# Patient Record
Sex: MALE | Race: WHITE | NOT HISPANIC OR LATINO | Employment: FULL TIME | RURAL
[De-identification: names, ages, dates, MRNs, and addresses within clinical notes are randomized per-mention and may not be internally consistent; named-entity substitution may affect disease eponyms.]

---

## 2023-01-29 ENCOUNTER — HOSPITAL ENCOUNTER (EMERGENCY)
Facility: HOSPITAL | Age: 63
Discharge: HOME | End: 2023-01-29
Attending: EMERGENCY MEDICINE
Payer: COMMERCIAL

## 2023-01-29 VITALS
RESPIRATION RATE: 16 BRPM | TEMPERATURE: 98 F | BODY MASS INDEX: 25.76 KG/M2 | OXYGEN SATURATION: 99 % | DIASTOLIC BLOOD PRESSURE: 79 MMHG | SYSTOLIC BLOOD PRESSURE: 149 MMHG | WEIGHT: 170 LBS | HEART RATE: 82 BPM | HEIGHT: 68 IN

## 2023-01-29 DIAGNOSIS — H00.012 HORDEOLUM EXTERNUM OF RIGHT LOWER EYELID: Primary | ICD-10-CM

## 2023-01-29 PROCEDURE — 99284 EMERGENCY DEPT VISIT MOD MDM: CPT | Mod: ,,, | Performed by: EMERGENCY MEDICINE

## 2023-01-29 PROCEDURE — 99283 EMERGENCY DEPT VISIT LOW MDM: CPT

## 2023-01-29 PROCEDURE — 99284 PR EMERGENCY DEPT VISIT,LEVEL IV: ICD-10-PCS | Mod: ,,, | Performed by: EMERGENCY MEDICINE

## 2023-01-29 RX ORDER — GENTAMICIN SULFATE 3 MG/ML
2 SOLUTION/ DROPS OPHTHALMIC 3 TIMES DAILY
Qty: 15 ML | Refills: 0 | Status: SHIPPED | OUTPATIENT
Start: 2023-01-29

## 2023-01-29 RX ORDER — GENTAMICIN SULFATE 3 MG/ML
2 SOLUTION/ DROPS OPHTHALMIC 3 TIMES DAILY
Qty: 15 ML | Refills: 0 | Status: SHIPPED | OUTPATIENT
Start: 2023-01-29 | End: 2023-01-29 | Stop reason: SDUPTHER

## 2023-01-29 NOTE — ED PROVIDER NOTES
"Encounter Date: 1/29/2023       History     Chief Complaint   Patient presents with    Eye Problem     C/o red bump on eyelid that began yesterday. No prior treatment.     PT HAS "RED BUMP" OF EYELID ONSET YESTERDAY. PT DENIES ADDITIONAL COMPLAINTS    Review of patient's allergies indicates:  No Known Allergies  No past medical history on file.  No past surgical history on file.  No family history on file.     Review of Systems   Constitutional:  Negative for fever.   HENT:  Negative for sore throat.    Eyes:  Positive for redness.   Respiratory:  Negative for shortness of breath.    Cardiovascular:  Negative for chest pain.   Gastrointestinal:  Negative for nausea.   Genitourinary:  Negative for dysuria.   Musculoskeletal:  Negative for back pain.   Skin:  Negative for rash.   Neurological:  Negative for weakness.   Hematological:  Does not bruise/bleed easily.     Physical Exam     Initial Vitals [01/29/23 0805]   BP Pulse Resp Temp SpO2   (!) 149/79 82 16 97.9 °F (36.6 °C) 99 %      MAP       --         Physical Exam    Constitutional: He appears well-developed and well-nourished. He is cooperative. He appears distressed.   HENT:   Head: Normocephalic and atraumatic.   Eyes: Conjunctivae and EOM are normal. Pupils are equal, round, and reactive to light. Right eye exhibits hordeolum.   Neck: Trachea normal. Neck supple.   Cardiovascular:  Regular rhythm, normal heart sounds and intact distal pulses.           Pulses:       Radial pulses are 3+ on the right side and 3+ on the left side.        Dorsalis pedis pulses are 3+ on the right side and 3+ on the left side.   Musculoskeletal:      Right shoulder: Normal.      Left shoulder: Normal.      Right upper arm: Normal.      Left upper arm: Normal.      Right elbow: Normal.      Left elbow: Normal.      Right forearm: Normal.      Left forearm: Normal.      Right wrist: Normal.      Left wrist: Normal.      Right hand: Normal.      Left hand: Normal.      Cervical " "back: Neck supple.     Lymphadenopathy:     He has no cervical adenopathy.     He has no axillary adenopathy.   Neurological: He is alert and oriented to person, place, and time. He has normal strength. No cranial nerve deficit or sensory deficit. He displays a negative Romberg sign. GCS eye subscore is 4. GCS verbal subscore is 5. GCS motor subscore is 6.   Reflex Scores:       Brachioradialis reflexes are 2+ on the right side and 2+ on the left side.       Patellar reflexes are 2+ on the right side and 2+ on the left side.  Skin: Skin is warm and dry. Capillary refill takes less than 2 seconds.   Psychiatric: He has a normal mood and affect. His speech is normal and behavior is normal. Judgment and thought content normal. Cognition and memory are normal.       Medical Screening Exam   See Full Note    ED Course   Procedures  Labs Reviewed - No data to display       Imaging Results    None          Medications - No data to display  Medical Decision Making:   Initial Assessment:   PT HAS "RED BUMP" OF EYELID ONSET YESTERDAY. PT DENIES ADDITIONAL COMPLAINTS  Differential Diagnosis:   HORDEOLUM  ED Management:  EXAM  DC IN STABLE CONDITION  WARM COMPRESSES 3 TIMES A DAY  DO NOT RUB EYE  FREQUENT HAND WASHING                 Clinical Impression:   Final diagnoses:  [H00.012] Hordeolum externum of right lower eyelid (Primary)        ED Disposition Condition    Discharge Stable          ED Prescriptions       Medication Sig Dispense Start Date End Date Auth. Provider    gentamicin (GARAMYCIN) 0.3 % ophthalmic solution  (Status: Discontinued) Place 2 drops into the right eye 3 (three) times daily. 15 mL 1/29/2023 1/29/2023 Marielena Qureshi MD    gentamicin (GARAMYCIN) 0.3 % ophthalmic solution Place 2 drops into the right eye 3 (three) times daily. 15 mL 1/29/2023 -- Marielena Qureshi MD          Follow-up Information       Follow up With Specialties Details Why Contact Info    [address], [facility] Call in 1 " week  [address] [facility]  [facility] [place] [address]  [phone]               Marielena Qureshi MD  06/10/23 5506

## 2023-10-30 ENCOUNTER — CLINICAL SUPPORT (OUTPATIENT)
Dept: AUDIOLOGY | Facility: CLINIC | Age: 63
End: 2023-10-30
Payer: COMMERCIAL

## 2023-10-30 ENCOUNTER — OFFICE VISIT (OUTPATIENT)
Dept: OTOLARYNGOLOGY | Facility: CLINIC | Age: 63
End: 2023-10-30
Payer: COMMERCIAL

## 2023-10-30 VITALS — WEIGHT: 170 LBS | BODY MASS INDEX: 25.76 KG/M2 | HEIGHT: 68 IN

## 2023-10-30 DIAGNOSIS — H90.3 SENSORINEURAL HEARING LOSS, BILATERAL: Primary | ICD-10-CM

## 2023-10-30 DIAGNOSIS — H90.3 SENSORY HEARING LOSS, BILATERAL: Primary | ICD-10-CM

## 2023-10-30 DIAGNOSIS — H90.3 SENSORINEURAL HEARING LOSS (SNHL) OF BOTH EARS: ICD-10-CM

## 2023-10-30 DIAGNOSIS — H61.23 BILATERAL IMPACTED CERUMEN: Primary | ICD-10-CM

## 2023-10-30 DIAGNOSIS — H90.2 CONDUCTIVE HEARING LOSS, UNSPECIFIED LATERALITY: ICD-10-CM

## 2023-10-30 PROCEDURE — 92588 EVOKED AUDITORY TST COMPLETE: CPT | Mod: PBBFAC | Performed by: AUDIOLOGIST

## 2023-10-30 PROCEDURE — 69210 PR REMOVAL IMPACTED CERUMEN REQUIRING INSTRUMENTATION, UNILATERAL: ICD-10-PCS | Mod: S$PBB,,, | Performed by: OTOLARYNGOLOGY

## 2023-10-30 PROCEDURE — 92588 EVOKED AUDITORY TST COMPLETE: CPT | Mod: 26,S$PBB,, | Performed by: AUDIOLOGIST

## 2023-10-30 PROCEDURE — 92588 EVOKED AUDITORY TST COMPLETE: ICD-10-PCS | Mod: 26,S$PBB,, | Performed by: AUDIOLOGIST

## 2023-10-30 PROCEDURE — 99212 OFFICE O/P EST SF 10 MIN: CPT | Mod: PBBFAC,25 | Performed by: AUDIOLOGIST

## 2023-10-30 PROCEDURE — 99499 UNLISTED E&M SERVICE: CPT | Mod: S$PBB,,, | Performed by: OTOLARYNGOLOGY

## 2023-10-30 PROCEDURE — 69210 REMOVE IMPACTED EAR WAX UNI: CPT | Mod: S$PBB,,, | Performed by: OTOLARYNGOLOGY

## 2023-10-30 PROCEDURE — 1159F PR MEDICATION LIST DOCUMENTED IN MEDICAL RECORD: ICD-10-PCS | Mod: CPTII,,, | Performed by: OTOLARYNGOLOGY

## 2023-10-30 PROCEDURE — 69210 REMOVE IMPACTED EAR WAX UNI: CPT | Mod: 50,PBBFAC | Performed by: OTOLARYNGOLOGY

## 2023-10-30 PROCEDURE — 1160F RVW MEDS BY RX/DR IN RCRD: CPT | Mod: CPTII,,, | Performed by: OTOLARYNGOLOGY

## 2023-10-30 PROCEDURE — 99212 OFFICE O/P EST SF 10 MIN: CPT | Mod: PBBFAC,25

## 2023-10-30 PROCEDURE — 99204 OFFICE O/P NEW MOD 45 MIN: CPT | Mod: 25,S$PBB,, | Performed by: OTOLARYNGOLOGY

## 2023-10-30 PROCEDURE — 1159F MED LIST DOCD IN RCRD: CPT | Mod: CPTII,,, | Performed by: OTOLARYNGOLOGY

## 2023-10-30 PROCEDURE — 99499 NO LOS: ICD-10-PCS | Mod: S$PBB,,, | Performed by: OTOLARYNGOLOGY

## 2023-10-30 PROCEDURE — 3008F PR BODY MASS INDEX (BMI) DOCUMENTED: ICD-10-PCS | Mod: CPTII,,, | Performed by: OTOLARYNGOLOGY

## 2023-10-30 PROCEDURE — 99213 OFFICE O/P EST LOW 20 MIN: CPT | Mod: PBBFAC | Performed by: OTOLARYNGOLOGY

## 2023-10-30 PROCEDURE — 1160F PR REVIEW ALL MEDS BY PRESCRIBER/CLIN PHARMACIST DOCUMENTED: ICD-10-PCS | Mod: CPTII,,, | Performed by: OTOLARYNGOLOGY

## 2023-10-30 PROCEDURE — 3008F BODY MASS INDEX DOCD: CPT | Mod: CPTII,,, | Performed by: OTOLARYNGOLOGY

## 2023-10-30 PROCEDURE — 99204 PR OFFICE/OUTPT VISIT, NEW, LEVL IV, 45-59 MIN: ICD-10-PCS | Mod: 25,S$PBB,, | Performed by: OTOLARYNGOLOGY

## 2023-10-30 NOTE — PROGRESS NOTES
Subjective:       Patient ID: Mustapha Christina is a 63 y.o. male.    Chief Complaint: Cerumen Impaction (Bilateral ear cleaning. ) and Hearing Loss (Bilateral hearing loss.)    Hearing Loss:    Associated symptoms: Ear pain.      Review of Systems   HENT:  Positive for ear pain and hearing loss.    All other systems reviewed and are negative.      Objective:      Physical Exam  General: NAD  Head: Normocephalic, atraumatic, no facial asymmetry/normal strength,  Ears: Both auricules normal in appearance, w/o deformities tympanic membranes normal external auditory canals severe wax impaction  Nose: External nose w/o deformities normal turbinates no drainage or inflammation  Oral Cavity: Lips, gums, floor of mouth, tongue hard palate, and buccal mucosa without mass/lesion  Oropharynx: Mucosa pink and moist, soft palate, posterior pharynx and oropharyngeal wall without mass/lesion  Neck: Supple, symmetric, trachea midline, no palpable mass/lesion, no palpable cervical lymphadenopathy  Skin: Warm and dry, no concerning lesions  Respiratory: Respirations even, unlabored     Procedure: Binocular microscopy with removal of cerumen impaction using microsurgical instrumentation.  After explaining the procedure and obtaining verbal assent,  each external auditory canal visualized with the 250 mm focal length lens through the operating microscope. The obstructing cerumen was removed with microsurgical instrumentation to reveal normal and healthy external auditory canals. The patient tolerated this procedure well without complication.     Assessment:       1. Bilateral impacted cerumen    2. Conductive hearing loss, unspecified laterality    3. Sensorineural hearing loss (SNHL) of both ears        Plan:       Reviewed and discussed audiogram in detail  quita DENNEY

## 2023-10-30 NOTE — PROGRESS NOTES
Consult:  Patient ref. by Dr Skyler Abel  Payer Status: VR  Patient preference : Ronny Brasher [id number]  Status: ordered No  Earmold: No    Will contact patient upon device arrival and schedule fitting.

## 2023-12-17 ENCOUNTER — HOSPITAL ENCOUNTER (EMERGENCY)
Facility: HOSPITAL | Age: 63
Discharge: HOME | End: 2023-12-17
Payer: COMMERCIAL

## 2023-12-17 VITALS
BODY MASS INDEX: 27.6 KG/M2 | HEART RATE: 97 BPM | TEMPERATURE: 98 F | RESPIRATION RATE: 20 BRPM | WEIGHT: 182.13 LBS | DIASTOLIC BLOOD PRESSURE: 80 MMHG | HEIGHT: 68 IN | OXYGEN SATURATION: 97 % | SYSTOLIC BLOOD PRESSURE: 133 MMHG

## 2023-12-17 DIAGNOSIS — J06.9 UPPER RESPIRATORY TRACT INFECTION, UNSPECIFIED TYPE: Primary | ICD-10-CM

## 2023-12-17 DIAGNOSIS — J32.9 SINUSITIS, UNSPECIFIED CHRONICITY, UNSPECIFIED LOCATION: ICD-10-CM

## 2023-12-17 DIAGNOSIS — J02.9 PHARYNGITIS, UNSPECIFIED ETIOLOGY: ICD-10-CM

## 2023-12-17 PROCEDURE — 99284 EMERGENCY DEPT VISIT MOD MDM: CPT | Mod: ,,, | Performed by: NURSE PRACTITIONER

## 2023-12-17 PROCEDURE — 99283 EMERGENCY DEPT VISIT LOW MDM: CPT

## 2023-12-17 PROCEDURE — 99284 PR EMERGENCY DEPT VISIT,LEVEL IV: ICD-10-PCS | Mod: ,,, | Performed by: NURSE PRACTITIONER

## 2023-12-17 RX ORDER — AZITHROMYCIN 500 MG/1
500 TABLET, FILM COATED ORAL DAILY
Qty: 3 TABLET | Refills: 0 | Status: SHIPPED | OUTPATIENT
Start: 2023-12-17

## 2023-12-17 RX ORDER — GUAIFENESIN AND DEXTROMETHORPHAN HYDROBROMIDE 1200; 60 MG/1; MG/1
1 TABLET, EXTENDED RELEASE ORAL 2 TIMES DAILY PRN
Qty: 20 TABLET | Refills: 0 | Status: SHIPPED | OUTPATIENT
Start: 2023-12-17 | End: 2023-12-27

## 2023-12-17 NOTE — Clinical Note
"Mustapha Lind" Carri was seen and treated in our emergency department on 12/17/2023.  He may return to work on 12/19/2023.       If you have any questions or concerns, please don't hesitate to call.      Abdi Campa, QUITAP"

## 2023-12-17 NOTE — DISCHARGE INSTRUCTIONS
Plenty of fluids  Mucinex as directed  Zithromax as directed  Follow-up with primary care or return to the emergency department if worsening symptoms

## 2023-12-17 NOTE — ED PROVIDER NOTES
Encounter Date: 12/17/2023       History     Chief Complaint   Patient presents with    Sore Throat    Nasal Congestion     64 y/o male presents for congestion, sore throat ongoing less than 24 hours.  Denies fever, but reported night sweat last night.  Has been around others as work with similar symptoms.  He is able to drink/eat without difficulty.  Denies OTC use with symptoms.  No chest pain or reported SOB.      Review of patient's allergies indicates:  No Known Allergies  History reviewed. No pertinent past medical history.  History reviewed. No pertinent surgical history.  History reviewed. No pertinent family history.     Review of Systems   Constitutional:  Negative for fever.   HENT:  Positive for congestion, postnasal drip, sinus pressure and sore throat.    Respiratory:  Negative for shortness of breath.    Cardiovascular:  Negative for chest pain.   Gastrointestinal:  Negative for nausea.   Genitourinary:  Negative for dysuria.   Musculoskeletal:  Negative for back pain.   Skin:  Negative for rash.   Neurological:  Negative for weakness.   Hematological:  Does not bruise/bleed easily.   All other systems reviewed and are negative.      Physical Exam     Initial Vitals [12/17/23 0828]   BP Pulse Resp Temp SpO2   133/80 97 20 98.2 °F (36.8 °C) 97 %      MAP       --         Physical Exam    Constitutional: He appears well-developed and well-nourished.   HENT:   Head: Normocephalic.   Eyes: EOM are normal. Pupils are equal, round, and reactive to light.   Neck: Neck supple.   Cardiovascular:  Normal rate, regular rhythm, normal heart sounds and intact distal pulses.           Pulmonary/Chest: Breath sounds normal.   Abdominal: Abdomen is soft. Bowel sounds are normal.   Musculoskeletal:         General: Normal range of motion.      Cervical back: Neck supple.     Neurological: He is alert and oriented to person, place, and time. He has normal strength. No cranial nerve deficit or sensory deficit. GCS score  is 15. GCS eye subscore is 4. GCS verbal subscore is 5. GCS motor subscore is 6.   Skin: Skin is warm and dry. Capillary refill takes less than 2 seconds.   Psychiatric: He has a normal mood and affect. His behavior is normal. Thought content normal.         Medical Screening Exam   See Full Note    ED Course   Procedures  Labs Reviewed - No data to display       Imaging Results    None          Medications - No data to display  Medical Decision Making  64 y/o male presents for congestion, sore throat ongoing less than 24 hours.  Denies fever, but reported night sweat last night.  Has been around others as work with similar symptoms.  He is able to drink/eat without difficulty.  Denies OTC use with symptoms.  No chest pain or reported SOB.    Amount and/or Complexity of Data Reviewed  Discussion of management or test interpretation with external provider(s): No needed labs/imaging, will treat symptoms with rx for zithromax and mucinex.  Plenty of fluids, will give day from work.  Simple urI presentation    Risk  OTC drugs.                                      Clinical Impression:   Final diagnoses:  [J06.9] Upper respiratory tract infection, unspecified type (Primary)  [J02.9] Pharyngitis, unspecified etiology  [J32.9] Sinusitis, unspecified chronicity, unspecified location        ED Disposition Condition    Discharge Stable          ED Prescriptions       Medication Sig Dispense Start Date End Date Auth. Provider    azithromycin (ZITHROMAX) 500 MG tablet Take 1 tablet (500 mg total) by mouth once daily. 3 tablet 12/17/2023 -- Abdi Campa, BRADY    dextromethorphan-guaiFENesin (MUCINEX DM) 60-1,200 mg per 12 hr tablet Take 1 tablet by mouth 2 (two) times daily as needed (congestion). 20 tablet 12/17/2023 12/27/2023 Abdi Campa FNP          Follow-up Information    None          Abdi Campa FNP  12/17/23 2692

## 2023-12-17 NOTE — ED TRIAGE NOTES
Presents to ED for complaints of chest and nasal congestion and sore throat since yesterday.  Took Nyquil last night.

## 2024-02-29 ENCOUNTER — CLINICAL SUPPORT (OUTPATIENT)
Dept: AUDIOLOGY | Facility: CLINIC | Age: 64
End: 2024-02-29
Payer: COMMERCIAL

## 2024-02-29 DIAGNOSIS — H90.3 SENSORY HEARING LOSS, BILATERAL: Primary | ICD-10-CM

## 2024-02-29 PROCEDURE — 99211 OFF/OP EST MAY X REQ PHY/QHP: CPT | Mod: PBBFAC

## 2024-02-29 PROCEDURE — 99499 UNLISTED E&M SERVICE: CPT | Mod: S$PBB,,, | Performed by: OTOLARYNGOLOGY

## 2024-02-29 NOTE — PROGRESS NOTES
Patient was fit through VR with Kasandra Jefferson [id number] [id number], [id number] with size 1 moderate rec and med open domes.

## 2024-05-16 ENCOUNTER — CLINICAL SUPPORT (OUTPATIENT)
Dept: AUDIOLOGY | Facility: CLINIC | Age: 64
End: 2024-05-16
Payer: COMMERCIAL

## 2024-05-16 DIAGNOSIS — H90.3 SENSORY HEARING LOSS, BILATERAL: Primary | ICD-10-CM

## 2024-06-12 ENCOUNTER — OFFICE VISIT (OUTPATIENT)
Dept: FAMILY MEDICINE | Facility: CLINIC | Age: 64
End: 2024-06-12
Payer: OTHER MISCELLANEOUS

## 2024-06-12 VITALS
SYSTOLIC BLOOD PRESSURE: 149 MMHG | TEMPERATURE: 97 F | DIASTOLIC BLOOD PRESSURE: 82 MMHG | BODY MASS INDEX: 27.74 KG/M2 | RESPIRATION RATE: 18 BRPM | OXYGEN SATURATION: 99 % | WEIGHT: 183 LBS | HEIGHT: 68 IN | HEART RATE: 70 BPM

## 2024-06-12 DIAGNOSIS — Z12.11 ENCOUNTER FOR SCREENING COLONOSCOPY: ICD-10-CM

## 2024-06-12 DIAGNOSIS — G56.22 ULNAR NEUROPATHY OF LEFT UPPER EXTREMITY: ICD-10-CM

## 2024-06-12 DIAGNOSIS — E78.5 HYPERLIPIDEMIA, UNSPECIFIED HYPERLIPIDEMIA TYPE: ICD-10-CM

## 2024-06-12 DIAGNOSIS — M54.50 ACUTE BILATERAL LOW BACK PAIN WITHOUT SCIATICA: ICD-10-CM

## 2024-06-12 DIAGNOSIS — S09.12XA LACERATION OF FASCIA OF HEAD: ICD-10-CM

## 2024-06-12 DIAGNOSIS — R55 SYNCOPE, UNSPECIFIED SYNCOPE TYPE: Primary | ICD-10-CM

## 2024-06-12 LAB
ALBUMIN SERPL BCP-MCNC: 4.2 G/DL (ref 3.5–5)
ALBUMIN/GLOB SERPL: 1.2 {RATIO}
ALP SERPL-CCNC: 111 U/L (ref 45–115)
ALT SERPL W P-5'-P-CCNC: 15 U/L (ref 16–61)
ANION GAP SERPL CALCULATED.3IONS-SCNC: 8 MMOL/L (ref 7–16)
AST SERPL W P-5'-P-CCNC: 15 U/L (ref 15–37)
BASOPHILS # BLD AUTO: 0.01 K/UL (ref 0–0.2)
BASOPHILS NFR BLD AUTO: 0.2 % (ref 0–1)
BILIRUB SERPL-MCNC: 0.4 MG/DL (ref ?–1.2)
BUN SERPL-MCNC: 19 MG/DL (ref 7–18)
BUN/CREAT SERPL: 23 (ref 6–20)
CALCIUM SERPL-MCNC: 9.1 MG/DL (ref 8.5–10.1)
CHLORIDE SERPL-SCNC: 108 MMOL/L (ref 98–107)
CHOLEST SERPL-MCNC: 183 MG/DL (ref 0–200)
CHOLEST/HDLC SERPL: 4.1 {RATIO}
CO2 SERPL-SCNC: 27 MMOL/L (ref 21–32)
CREAT SERPL-MCNC: 0.83 MG/DL (ref 0.7–1.3)
DIFFERENTIAL METHOD BLD: ABNORMAL
EGFR (NO RACE VARIABLE) (RUSH/TITUS): 99 ML/MIN/1.73M2
EOSINOPHIL # BLD AUTO: 0.14 K/UL (ref 0–0.5)
EOSINOPHIL NFR BLD AUTO: 2.2 % (ref 1–4)
ERYTHROCYTE [DISTWIDTH] IN BLOOD BY AUTOMATED COUNT: 12.9 % (ref 11.5–14.5)
EST. AVERAGE GLUCOSE BLD GHB EST-MCNC: 94 MG/DL
GLOBULIN SER-MCNC: 3.4 G/DL (ref 2–4)
GLUCOSE SERPL-MCNC: 91 MG/DL (ref 74–106)
HBA1C MFR BLD HPLC: 4.9 % (ref 4.5–6.6)
HCT VFR BLD AUTO: 43.8 % (ref 40–54)
HDLC SERPL-MCNC: 45 MG/DL (ref 40–60)
HGB BLD-MCNC: 14.4 G/DL (ref 13.5–18)
IMM GRANULOCYTES # BLD AUTO: 0.01 K/UL (ref 0–0.04)
IMM GRANULOCYTES NFR BLD: 0.2 % (ref 0–0.4)
LDLC SERPL CALC-MCNC: 111 MG/DL
LDLC/HDLC SERPL: 2.5 {RATIO}
LYMPHOCYTES # BLD AUTO: 1.28 K/UL (ref 1–4.8)
LYMPHOCYTES NFR BLD AUTO: 20.3 % (ref 27–41)
MCH RBC QN AUTO: 29 PG (ref 27–31)
MCHC RBC AUTO-ENTMCNC: 32.9 G/DL (ref 32–36)
MCV RBC AUTO: 88.1 FL (ref 80–96)
MONOCYTES # BLD AUTO: 0.54 K/UL (ref 0–0.8)
MONOCYTES NFR BLD AUTO: 8.5 % (ref 2–6)
MPC BLD CALC-MCNC: 11.2 FL (ref 9.4–12.4)
NEUTROPHILS # BLD AUTO: 4.34 K/UL (ref 1.8–7.7)
NEUTROPHILS NFR BLD AUTO: 68.6 % (ref 53–65)
NONHDLC SERPL-MCNC: 138 MG/DL
NRBC # BLD AUTO: 0 X10E3/UL
NRBC, AUTO (.00): 0 %
PLATELET # BLD AUTO: 251 K/UL (ref 150–400)
POTASSIUM SERPL-SCNC: 5 MMOL/L (ref 3.5–5.1)
PROT SERPL-MCNC: 7.6 G/DL (ref 6.4–8.2)
RBC # BLD AUTO: 4.97 M/UL (ref 4.6–6.2)
SODIUM SERPL-SCNC: 138 MMOL/L (ref 136–145)
TRIGL SERPL-MCNC: 136 MG/DL (ref 35–150)
VLDLC SERPL-MCNC: 27 MG/DL
WBC # BLD AUTO: 6.32 K/UL (ref 4.5–11)

## 2024-06-12 PROCEDURE — 80061 LIPID PANEL: CPT | Mod: ,,, | Performed by: CLINICAL MEDICAL LABORATORY

## 2024-06-12 PROCEDURE — 96372 THER/PROPH/DIAG INJ SC/IM: CPT | Mod: ,,, | Performed by: FAMILY MEDICINE

## 2024-06-12 PROCEDURE — 99204 OFFICE O/P NEW MOD 45 MIN: CPT | Mod: 25,,, | Performed by: FAMILY MEDICINE

## 2024-06-12 PROCEDURE — 85025 COMPLETE CBC W/AUTO DIFF WBC: CPT | Mod: ,,, | Performed by: CLINICAL MEDICAL LABORATORY

## 2024-06-12 PROCEDURE — 15853 REMOVAL SUTR/STAPL XREQ ANES: CPT | Mod: ,,, | Performed by: FAMILY MEDICINE

## 2024-06-12 PROCEDURE — 83036 HEMOGLOBIN GLYCOSYLATED A1C: CPT | Mod: ,,, | Performed by: CLINICAL MEDICAL LABORATORY

## 2024-06-12 PROCEDURE — 80053 COMPREHEN METABOLIC PANEL: CPT | Mod: ,,, | Performed by: CLINICAL MEDICAL LABORATORY

## 2024-06-12 RX ORDER — TIZANIDINE 2 MG/1
2 TABLET ORAL EVERY 8 HOURS
COMMUNITY
End: 2024-06-19

## 2024-06-12 RX ORDER — METHYLPREDNISOLONE 4 MG/1
TABLET ORAL
Qty: 21 EACH | Refills: 0 | Status: SHIPPED | OUTPATIENT
Start: 2024-06-12 | End: 2024-07-03

## 2024-06-12 RX ORDER — CEPHALEXIN 750 MG/1
750 CAPSULE ORAL 2 TIMES DAILY
COMMUNITY

## 2024-06-12 RX ORDER — KETOROLAC TROMETHAMINE 10 MG/1
10 TABLET, FILM COATED ORAL EVERY 6 HOURS
COMMUNITY
End: 2024-06-19

## 2024-06-12 RX ORDER — METHYLPREDNISOLONE ACETATE 40 MG/ML
40 INJECTION, SUSPENSION INTRA-ARTICULAR; INTRALESIONAL; INTRAMUSCULAR; SOFT TISSUE
Status: COMPLETED | OUTPATIENT
Start: 2024-06-12 | End: 2024-06-12

## 2024-06-12 RX ORDER — DEXAMETHASONE SODIUM PHOSPHATE 4 MG/ML
4 INJECTION, SOLUTION INTRA-ARTICULAR; INTRALESIONAL; INTRAMUSCULAR; INTRAVENOUS; SOFT TISSUE
Status: COMPLETED | OUTPATIENT
Start: 2024-06-12 | End: 2024-06-12

## 2024-06-12 RX ADMIN — DEXAMETHASONE SODIUM PHOSPHATE 4 MG: 4 INJECTION, SOLUTION INTRA-ARTICULAR; INTRALESIONAL; INTRAMUSCULAR; INTRAVENOUS; SOFT TISSUE at 11:06

## 2024-06-12 RX ADMIN — METHYLPREDNISOLONE ACETATE 40 MG: 40 INJECTION, SUSPENSION INTRA-ARTICULAR; INTRALESIONAL; INTRAMUSCULAR; SOFT TISSUE at 11:06

## 2024-06-12 NOTE — PROGRESS NOTES
"Subjective     Patient ID: Mustapha Christina is a 63 y.o. male.    Chief Complaint: Laceration (Room 2 sick side 1 week f/u re falling out of a dump truck and received a laceration to the head, went to e.r. and received staples.)    Patient is a 63 year old  male who presents to the clinic after a syncopal episode/fall at work 1 week ago.    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 He states that he lost consciousness during the event. He presented to [facility] ER on 6/5/2024. Per the patient, he had a tetanus shot and 7 staples were placed in his posterior head laceration. He denies any pus from the area. He states that he had a CT of his head that was normal and an x-ray of his LT arm that demonstrated no fractures. He was D/C with Toradol, Zanaflex, and Cephalexin. The patient has 2 pills left of the Cephalexin. He states that the Toradol and Zanaflex have minimally improved his symptoms. He denies any visual changes, headaches, nausea, or vomiting since the accident. He is using a cane to ambulate currently. He states that he becomes lightheaded when he gets up too fast or rolls over in bed. He denies dizziness or the feeling of "spinning." He has not seen a physician in 30 years. He denies any PMH and states that he takes Pepcid daily for GERD. He has NKDA. He has never seen a cardiologist. He denies chest pain or palpitations during the incident. He has never had an ECHO. No history of seizures.     ULNAR NEUROPATHY OF LEFT ARM   The patient states that he is having tenderness along his ulnar left arm from the elbow down. He is also complaining of numbness in his 5th and 4th digits. He denies any weakness and has good  strength.     ACUTE LOW " BACK PAIN, W/O SCIATICA  The patient states that he is having sacral/low back pain that is a 6/10 on the pain scale. He states that it is exacerbated by being seated or bending and lifting. He has been using Lidocaine patches with minimal relief. He states that the Toradol and muscle relaxer is not relieving his pain. He has noticed the pain most when lifting his dog.     SCREENING   The patient has never had a colonoscopy     The patient was a cigarette smoker from the age of [age], 1ppd. He quit smoking and started using dip (2 days per can) from the age of [age]. He has not had a LDCT of his lungs to screen for lung cancer.         Review of Systems   Constitutional:  Negative for chills, fatigue and fever.   HENT:  Negative for nasal congestion, sinus pressure/congestion and sore throat.    Respiratory:  Negative for cough, chest tightness, shortness of breath and wheezing.    Cardiovascular:  Negative for chest pain, palpitations and leg swelling.   Gastrointestinal:  Positive for reflux. Negative for abdominal pain, diarrhea, nausea and vomiting.   Genitourinary:  Negative for dysuria.   Musculoskeletal:  Positive for arthralgias, back pain and gait problem.   Integumentary:  Positive for wound and mole/lesion.   Neurological:  Positive for light-headedness and numbness. Negative for dizziness, facial asymmetry, weakness and headaches.          Objective   Vitals:    06/12/24 0908   BP: 130/87   Pulse: 70   Resp: 18   Temp: 97.4 °F (36.3 °C)       Physical Exam  Constitutional:       Appearance: Normal appearance.   HENT:      Head:        Comments: Well healed laceration of posterior head with 7 staples       Right Ear: Tympanic membrane, ear canal and external ear normal.      Left Ear: Tympanic membrane, ear canal and external ear normal.      Nose: Nose normal.      Mouth/Throat:      Mouth: Mucous membranes are moist.   Eyes:      Extraocular Movements: Extraocular movements intact.       Conjunctiva/sclera: Conjunctivae normal.      Pupils: Pupils are equal, round, and reactive to light.   Cardiovascular:      Rate and Rhythm: Normal rate and regular rhythm.      Pulses: Normal pulses.      Heart sounds: Normal heart sounds.   Pulmonary:      Effort: Pulmonary effort is normal.      Breath sounds: Normal breath sounds.   Abdominal:      General: Bowel sounds are normal.      Palpations: Abdomen is soft.   Musculoskeletal:         General: Normal range of motion.      Cervical back: Normal range of motion.   Lymphadenopathy:      Cervical: No cervical adenopathy.   Skin:     General: Skin is warm.      Findings: Lesion and wound present.             Comments: Abrasions noted on the left arm     7 staples in the posterior head laceration. No erythema or pus noted. Well healed    Neurological:      General: No focal deficit present.      Mental Status: He is alert.      Cranial Nerves: Cranial nerves 2-12 are intact.      Sensory: Sensory deficit present.      Motor: Motor function is intact. No weakness.      Coordination: Coordination is intact.      Gait: Gait is intact.      Comments: Numbness in the LT arm below the elbow in the ulnar distribution into the 4th and 5th digits    Motor 5/5 in the UE and LE     All cranial nerves intact      Psychiatric:         Mood and Affect: Mood normal.         Behavior: Behavior normal.         Thought Content: Thought content normal.         Judgment: Judgment normal.          Assessment and Plan     1. Syncope, unspecified syncope type  Assessment & Plan:  Patient had loss of consciousness during the episode of falling out of his dump truck 1 week ago. He states that he does not have a seizure history and there was no tongue biting, generalized tonic clonic movements, or urinary incontinence during the episode. The patient has never seen a cardiologist. Unlikely to be neurological in etiology     - EKG pending (EKG machine is broken at the clinic)   - ECHO  pending  - Bilateral Carotid Dopplers pending  - Orthostatic vitals obtained today   - Lab work (CBC, CMP, Lipid panel, Hba1c)    Orders:  -     POCT EKG 12-LEAD (Manually Resulted by Ordering Provider)  -     Echo; Future; Expected date: 06/12/2024  -     CBC Auto Differential; Future; Expected date: 06/12/2024  -     Comprehensive Metabolic Panel; Future; Expected date: 06/12/2024  -     Lipid Panel; Future; Expected date: 06/12/2024  -     Hemoglobin A1C; Future; Expected date: 06/12/2024  -     Radiology US Carotid Bilateral; Future; Expected date: 06/12/2024    2. Acute bilateral low back pain without sciatica  Assessment & Plan:  Patient had a CT of his lumbar spine at [facility]. Patient states that it was negative for fractures. Obtaining records from [facility] ER visit from 6/5/2024. Patient states that he is having sacral back pain. Taking Toradol and Zanaflex with minimal relief of symptoms. Patient is a Workman's Comp case as he works for the [employer].     Monitor - Monitor for weakness/numbness/urinary incontinence/fecal incontinence/saddle anesthesia   Evaluate - No tenderness to palpation of cervical/thoracic/lumbar spine. Muscle spasm noted in paraspinals of lumbar spine.   Assessment - Acute bilateral low back pain secondary to fall/syncope  Treat: Obtain records from [facility].   - Discussed alternating heating pad with ice   - Discussed TENS unit from [employer]  - OTC pain patches/Aspercreme/Voltaren gel OTC   - Solumedrol 40mg + Dexamethasone 4mg IM once in clinic  - Medrol dose pack   - PT/OT referral to [facility]   - Follow-up in 2 weeks    Orders:  -     methylPREDNISolone acetate injection 40 mg  -     dexAMETHasone injection 4 mg  -     Ambulatory referral/consult to Physical/Occupational Therapy; Future; Expected date: 06/19/2024  -     methylPREDNISolone (MEDROL DOSEPACK) 4 mg tablet; use as directed  Dispense: 21 each; Refill: 0    3. Laceration of fascia of head  Assessment & Plan:  Removed 7  staples from occipital head that were placed 7 days ago. No erythema or pus formation at the site. Patient has been taking Cephalexin and has 2 pills left. Encouraged the patient to take the remaining 2 pills.     Orders:  -     Remove staples; Future    4. Ulnar neuropathy of left upper extremity  Assessment & Plan:  Monitor - Monitor for worsening numbness/weakness in the left arm/hand  Evaluate - Physical exam demonstrates tenderness along the ulnar LT arm with decreased sensation below the elbow and in the 5th and 4th digits. Motor 5/5 of the LT extremity  Assessment - Ulnar Neuropathy of the LT upper extremity, likely secondary to contusion from fall from several feet  Treat - Medrol dose pack. Continue Toradol from ER. PT referral pending. Ortho referral (Dr. Reyes). Solumedrol/Decadron IM once in clinic       Orders:  -     Ambulatory referral/consult to Orthopedics; Future; Expected date: 06/19/2024  -     Ambulatory referral/consult to Physical/Occupational Therapy; Future; Expected date: 06/19/2024  -     methylPREDNISolone (MEDROL DOSEPACK) 4 mg tablet; use as directed  Dispense: 21 each; Refill: 0    5. Encounter for screening colonoscopy  Assessment & Plan:  Patient has never had a colonoscopy. Colonoscopy referral placed today. Patient denies family history of colon cancer. He also denies any blood in his stools/dark tarry stools.     Orders:  -     Colonoscopy; Future; Expected date: 06/12/2024                 Follow up in about 2 weeks (around 6/26/2024).

## 2024-06-12 NOTE — ASSESSMENT & PLAN NOTE
Removed 7 staples from occipital head that were placed 7 days ago. No erythema or pus formation at the site. Patient has been taking Cephalexin and has 2 pills left. Encouraged the patient to take the remaining 2 pills.

## 2024-06-12 NOTE — LETTER
June 12, 2024      [facility]  [address]  [place] [address]  Phone: [phone]  Fax: [phone]       Patient: Mustapha Christina   YOB: 1960  Date of Visit: 06/12/2024    To Whom It May Concern:    Kinza Christina  was at [facility] on 06/12/2024. The patient should be excused from work for 2 weeks. If you have any questions or concerns, or if I can be of further assistance, please do not hesitate to contact me.    Sincerely,    Sofiya Pedraza MD

## 2024-06-12 NOTE — ASSESSMENT & PLAN NOTE
Patient had a CT of his lumbar spine at [facility]. Patient states that it was negative for fractures. Obtaining records from [facility] ER visit from 6/5/2024. Patient states that he is having sacral back pain. Taking Toradol and Zanaflex with minimal relief of symptoms. Patient is a Workman's Comp case as he works for the [employer].     Monitor - Monitor for weakness/numbness/urinary incontinence/fecal incontinence/saddle anesthesia   Evaluate - No tenderness to palpation of cervical/thoracic/lumbar spine. Muscle spasm noted in paraspinals of lumbar spine.   Assessment - Acute bilateral low back pain secondary to fall/syncope  Treat: Obtain records from [facility].   - Discussed alternating heating pad with ice   - Discussed TENS unit from [employer]  - OTC pain patches/Aspercreme/Voltaren gel OTC   - Solumedrol 40mg + Dexamethasone 4mg IM once in clinic  - Medrol dose pack   - PT/OT referral to [facility]   - Follow-up in 2 weeks

## 2024-06-12 NOTE — ASSESSMENT & PLAN NOTE
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 He states that he does not have a seizure history and there was no tongue biting, generalized tonic clonic movements, or urinary incontinence during the episode. The patient has never seen a cardiologist. Unlikely to be neurological in etiology     - EKG pending (EKG machine is broken at the clinic)   - ECHO pending  - Bilateral Carotid Dopplers pending  - Orthostatic vitals obtained today   - Lab work (CBC, CMP, Lipid panel, Hba1c)

## 2024-06-12 NOTE — PROGRESS NOTES
I have reviewed the notes, assessments, and/or procedures performed by DR STROUD, I concur with his documentation of Mustapha Christina.    Date of Service: 6/12/2024  1. 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 Concussive symptoms since last week.dizziness when rolls over in bed.  He states that he does not have a seizure history and there was no tongue biting, generalized tonic clonic movements, or urinary incontinence during the episode.   The patient has never seen a cardiologist.   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   - EKG pending (EKG machine is broken at the clinic)   - ECHO pending  - Bilateral Carotid Dopplers pending  - Orthostatic vitals obtained today   - Lab work (CBC, CMP, Lipid panel, Hba1c)     Orders:  -     POCT EKG 12-LEAD (Manually Resulted by Ordering Provider)  -     Echo; Future; Expected date: 06/12/2024  -     CBC Auto Differential; Future; Expected date: 06/12/2024  -     Comprehensive Metabolic Panel; Future; Expected date: 06/12/2024  -     Lipid Panel; Future; Expected date: 06/12/2024  -     Hemoglobin A1C; Future; Expected date: 06/12/2024  -     Radiology US Carotid Bilateral; Future; Expected date: 06/12/2024     2. Acute bilateral low back pain without sciatica  Assessment & Plan:  Patient had a CT of his lumbar spine at [facility].   Patient states that it was negative for fractures.   Obtaining records from [facility] ER visit from 6/5/2024.   Patient states that he is having sacral back pain.   Taking Toradol and Zanaflex with minimal relief of symptoms.   Patient is a Workman's Comp case as he works for the [employer].      Monitor - Monitor for weakness/numbness/urinary  incontinence/fecal incontinence/saddle anesthesia   Evaluate - No tenderness to palpation of cervical/thoracic/lumbar spine.   Muscle spasm noted in paraspinals of lumbar spine.   Assessment - Acute bilateral low back pain secondary to fall/syncope  Treat: Obtain records from [facility].   - Discussed alternating heating pad with ice   - Discussed TENS unit from [employer]  - OT pain patches/Aspercreme/Voltaren gel OTC   - Solumedrol 40mg + Dexamethasone 4mg IM once in clinic  - Medrol dose pack   - PT/OT referral to [facility]   - Follow-up in 2 weeks     Orders:  -     methylPREDNISolone acetate injection 40 mg  -     dexAMETHasone injection 4 mg  -     Ambulatory referral/consult to Physical/Occupational Therapy; Future; Expected date: 06/19/2024  -     methylPREDNISolone (MEDROL DOSEPACK) 4 mg tablet; use as directed  Dispense: 21 each; Refill: 0     3. Laceration of fascia of head  Assessment & Plan:  Removed 7 staples from occipital head that were placed 7 days ago.   No erythema or pus formation at the site.   Patient has been taking Cephalexin and has 2 pills left. Encouraged the patient to take the remaining 2 pills.        Remove staples; Future     4. Ulnar neuropathy of left upper extremity  Assessment & Plan:  Monitor - Monitor for worsening numbness/weakness in the left arm/hand  Evaluate - Physical exam demonstrates tenderness along the ulnar LT arm with decreased sensation below the elbow and in the 5th and 4th digits. Motor 5/5 of the LT extremity  Assessment - Ulnar Neuropathy of the LT upper extremity, likely secondary to contusion from fall from several feet  Treat - Medrol dose pack. Continue Toradol from ER. PT referral pending. Ortho referral (Dr. Reyes). Solumedrol/Decadron IM once in clinic         Orders:  -     Ambulatory referral/consult to Orthopedics; Future; Expected date: 06/19/2024  -     Ambulatory referral/consult to Physical/Occupational Therapy; Future; Expected date: 06/19/2024  -      methylPREDNISolone (MEDROL DOSEPACK) 4 mg tablet; use as directed  Dispense: 21 each; Refill: 0     5. Encounter for screening colonoscopy  Assessment & Plan:  Patient has never had a colonoscopy. Colonoscopy referral placed today. Patient denies family history of colon cancer. He also denies any blood in his stools/dark tarry stools.      Orders:  -     Colonoscopy; Future; Expected date: 06/12/2024                          Follow up in about 2 weeks (around 6/26/2024).

## 2024-06-12 NOTE — ASSESSMENT & PLAN NOTE
Patient has never had a colonoscopy. Colonoscopy referral placed today. Patient denies family history of colon cancer. He also denies any blood in his stools/dark tarry stools.

## 2024-06-12 NOTE — ASSESSMENT & PLAN NOTE
Monitor - Monitor for worsening numbness/weakness in the left arm/hand  Evaluate - Physical exam demonstrates tenderness along the ulnar LT arm with decreased sensation below the elbow and in the 5th and 4th digits. Motor 5/5 of the LT extremity  Assessment - Ulnar Neuropathy of the LT upper extremity, likely secondary to contusion from fall from several feet  Treat - Medrol dose pack. Continue Toradol from ER. PT referral pending. Ortho referral (Dr. Reyes). Solumedrol/Decadron IM once in clinic

## 2024-06-13 DIAGNOSIS — Z12.11 COLON CANCER SCREENING: Primary | ICD-10-CM

## 2024-06-13 PROBLEM — E78.5 HYPERLIPIDEMIA: Status: ACTIVE | Noted: 2024-06-13

## 2024-06-13 RX ORDER — ROSUVASTATIN CALCIUM 10 MG/1
10 TABLET, COATED ORAL NIGHTLY
Qty: 90 TABLET | Refills: 1 | Status: SHIPPED | OUTPATIENT
Start: 2024-06-13 | End: 2024-06-19 | Stop reason: SDUPTHER

## 2024-06-13 NOTE — PROGRESS NOTES
CMP demonstrates dehydration with an elevated BUN/creatinine ratio. CBC is largely normal. HBA1c is 4.9.

## 2024-06-13 NOTE — PROGRESS NOTES
Patient is not currently on any daily medications other than Pepcid. Lipid panel demonstrates ASCVD risk of 13% risk of MI/Stroke in next 10 years. Will send in moderate intensity statin - Rosuvastatin 10mg QD.

## 2024-06-14 RX ORDER — POLYETHYLENE GLYCOL 3350, SODIUM SULFATE ANHYDROUS, SODIUM BICARBONATE, SODIUM CHLORIDE, POTASSIUM CHLORIDE 236; 22.74; 6.74; 5.86; 2.97 G/4L; G/4L; G/4L; G/4L; G/4L
4 POWDER, FOR SOLUTION ORAL ONCE
Qty: 4000 ML | Refills: 0 | Status: SHIPPED | OUTPATIENT
Start: 2024-06-14 | End: 2024-06-14

## 2024-06-17 DIAGNOSIS — R55 SYNCOPE AND COLLAPSE: Primary | ICD-10-CM

## 2024-06-19 ENCOUNTER — OFFICE VISIT (OUTPATIENT)
Dept: FAMILY MEDICINE | Facility: CLINIC | Age: 64
End: 2024-06-19
Payer: COMMERCIAL

## 2024-06-19 VITALS
HEART RATE: 69 BPM | SYSTOLIC BLOOD PRESSURE: 123 MMHG | DIASTOLIC BLOOD PRESSURE: 80 MMHG | BODY MASS INDEX: 27.22 KG/M2 | TEMPERATURE: 98 F | OXYGEN SATURATION: 95 % | RESPIRATION RATE: 18 BRPM | WEIGHT: 179.63 LBS | HEIGHT: 68 IN

## 2024-06-19 DIAGNOSIS — M54.50 ACUTE RIGHT-SIDED LOW BACK PAIN WITHOUT SCIATICA: Primary | ICD-10-CM
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
## 2024-06-19 DIAGNOSIS — E78.5 HYPERLIPIDEMIA, UNSPECIFIED HYPERLIPIDEMIA TYPE: ICD-10-CM

## 2024-06-19 DIAGNOSIS — G56.22 ULNAR NEUROPATHY OF LEFT UPPER EXTREMITY: ICD-10-CM

## 2024-06-19 DIAGNOSIS — M48.061 FORAMINAL STENOSIS OF LUMBAR REGION: ICD-10-CM

## 2024-06-19 DIAGNOSIS — M48.02 SPINAL STENOSIS OF CERVICAL REGION: ICD-10-CM

## 2024-06-19 PROCEDURE — 3008F BODY MASS INDEX DOCD: CPT | Mod: CPTII,,, | Performed by: FAMILY MEDICINE

## 2024-06-19 PROCEDURE — 1159F MED LIST DOCD IN RCRD: CPT | Mod: CPTII,,, | Performed by: FAMILY MEDICINE

## 2024-06-19 PROCEDURE — 3044F HG A1C LEVEL LT 7.0%: CPT | Mod: CPTII,,, | Performed by: FAMILY MEDICINE

## 2024-06-19 PROCEDURE — 3079F DIAST BP 80-89 MM HG: CPT | Mod: CPTII,,, | Performed by: FAMILY MEDICINE

## 2024-06-19 PROCEDURE — 3074F SYST BP LT 130 MM HG: CPT | Mod: CPTII,,, | Performed by: FAMILY MEDICINE

## 2024-06-19 PROCEDURE — 99213 OFFICE O/P EST LOW 20 MIN: CPT | Mod: ,,, | Performed by: FAMILY MEDICINE

## 2024-06-19 RX ORDER — CYCLOBENZAPRINE HCL 5 MG
5 TABLET ORAL NIGHTLY
Qty: 60 TABLET | Refills: 0 | Status: SHIPPED | OUTPATIENT
Start: 2024-06-19 | End: 2024-08-18

## 2024-06-19 RX ORDER — CELECOXIB 200 MG/1
200 CAPSULE ORAL DAILY
Qty: 30 CAPSULE | Refills: 1 | Status: SHIPPED | OUTPATIENT
Start: 2024-06-19 | End: 2024-08-18

## 2024-06-19 RX ORDER — ROSUVASTATIN CALCIUM 10 MG/1
10 TABLET, COATED ORAL NIGHTLY
Qty: 90 TABLET | Refills: 1 | Status: SHIPPED | OUTPATIENT
Start: 2024-06-19 | End: 2024-12-16

## 2024-06-19 NOTE — PROGRESS NOTES
Subjective     Patient ID: Mustapha Christina is a 63 y.o. male.    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     ACUTE BILATERAL LUMBAR BACK PAIN (RT > LT) DUE TO A FALL  The patient has finished his round of steroids, and states that his acute back pain is improving, but still present. He states that he notices the back pain mostly when he bends down to  something, and he feels it mainly in his right buttocks. The patient denies any radiation of the pain down his legs. He denies any weakness, numbness, urinary incontinence/fecal incontinence, or saddle anesthesia. The patient has been using some of his wife's Naproxen for pain relief. He did not get the muscle relaxer or toradol pills prescribed by [facility]. The patient states that the pain is a 8/10 on the pain scale at its worse, 4/10 at its least. The patient has appointments with OT and PT starting early next week. The patient also has appointments for cardiac work-up of syncope: Carotid Doppler, ECHO, EKG.     ULNAR NEUROPATHY  The patient states that he has sensation back in his 5th and 4th digits of his left hand. However, he still has some numbness present along the ulnar side of his forearm.     SCALP LACERATION   The scalp laceration is well healed.       Review of Systems   Constitutional:  Negative for chills, fatigue and fever.   HENT:  Negative for nasal congestion, sinus pressure/congestion and sore throat.    Respiratory:  Negative for cough, chest tightness, shortness of breath and wheezing.    Cardiovascular:  Negative for chest pain, palpitations and leg swelling.   Gastrointestinal:  Positive for reflux. Negative for abdominal pain, diarrhea, nausea and  vomiting.   Genitourinary:  Negative for dysuria.   Musculoskeletal:  Positive for arthralgias, back pain and gait problem.   Integumentary:  Positive for wound and mole/lesion.   Neurological:  Positive for light-headedness and numbness. Negative for dizziness, facial asymmetry, weakness and headaches.          Objective   Vitals:    06/19/24 1032   BP: 123/80   Pulse: 69   Resp: 18   Temp: 98 °F (36.7 °C)       Physical Exam  Constitutional:       Appearance: Normal appearance.   HENT:      Head:        Comments: Well healed laceration of posterior head; scab present. No erythema or pus       Right Ear: Tympanic membrane, ear canal and external ear normal.      Left Ear: Tympanic membrane, ear canal and external ear normal.      Nose: Nose normal.      Mouth/Throat:      Mouth: Mucous membranes are moist.   Eyes:      Extraocular Movements: Extraocular movements intact.      Conjunctiva/sclera: Conjunctivae normal.      Pupils: Pupils are equal, round, and reactive to light.   Cardiovascular:      Rate and Rhythm: Normal rate and regular rhythm.      Pulses: Normal pulses.      Heart sounds: Normal heart sounds.   Pulmonary:      Effort: Pulmonary effort is normal.      Breath sounds: Normal breath sounds.   Abdominal:      General: Bowel sounds are normal.      Palpations: Abdomen is soft.   Musculoskeletal:         General: Normal range of motion.      Cervical back: Normal range of motion.   Lymphadenopathy:      Cervical: No cervical adenopathy.   Skin:     General: Skin is warm.      Findings: Lesion and wound present.             Comments: Abrasions noted on the left arm     No erythema or pus noted in the scalp laceration. Well healed    Neurological:      General: No focal deficit present.      Mental Status: He is alert.      Cranial Nerves: Cranial nerves 2-12 are intact.      Sensory: Sensory deficit present.      Motor: Motor function is intact. No weakness.      Coordination: Coordination is intact.       Gait: Gait is intact.      Comments: Numbness in the LT arm below the elbow in the ulnar distribution. Sensation has returned to the 5th and 4th digits    Motor 5/5 in the UE and LE.  strength intact    All cranial nerves intact      Psychiatric:         Mood and Affect: Mood normal.         Behavior: Behavior normal.         Thought Content: Thought content normal.         Judgment: Judgment normal.          Assessment and Plan     1. Acute right-sided low back pain without sciatica  -     celecoxib (CELEBREX) 200 MG capsule; Take 1 capsule (200 mg total) by mouth once daily.  Dispense: 30 capsule; Refill: 1  -     cyclobenzaprine (FLEXERIL) 5 MG tablet; Take 1 tablet (5 mg total) by mouth nightly.  Dispense: 60 tablet; Refill: 0    2. Spinal stenosis of cervical region  Overview:  CT of Cervical Spine from [facility] (6/5/2024): Spinal canal stenosis notably of C6-C7 where this is moderate as well as moderate foraminal stenosis at this level.     Orders:  -     Ambulatory referral/consult to Back & Spine Clinic; Future; Expected date: 06/26/2024    3. Foraminal stenosis of lumbar region  Overview:  CT Lumbar spine at [facility] 6/5/2024: Mild to moderate foraminal stenoses L3-L4 through L5-S1 bilaterally. Suspect mild spinal canal stenosis at these levels as well    Assessment & Plan:  CT of the lumbar spine performed in the [facility] ER demonstrated mild to moderate foraminal stenoses of L3-L4 through L5-S1 bilaterally. Mild spinal canal stenosis also seen at these levels. Patient states that the back pain is improving, but still present. Will refer the patient to a Spine physician - Abdi Guevara at [facility] in [place], [place] for evaluation. Patient will also start taking Meloxicam daily for pain/inflammation and Flexeril 5mg QHS for muscle spasm.     Orders:  -     Ambulatory referral/consult to Back & Spine Clinic; Future; Expected date: 06/26/2024    4. Ulnar neuropathy of left upper  extremity  Assessment & Plan:  Patient has been referred to Dr. Reyes and has an appointment scheduled for next week. Sensation is returning to the 5th and 4th digits of the left hand. He is still having some numbness over the lateral left UE, likely secondary to the fall and contusion.       5. Injury resulting from fall from height  Overview:  Injuries sustained due to fall from a height of the dump truck to the ground. Laceration of scalp well healed. Back pain is improving, but still present. Ulnar neuropathy improving, but still present.       6. Hyperlipidemia, unspecified hyperlipidemia type  Overview:  ASCVD score is 13% risk of coronary or stroke in next 10 years. Started on Rosuvastatin 10mg QHS. (6*/13*/2024)    Orders:  -     rosuvastatin (CRESTOR) 10 MG tablet; Take 1 tablet (10 mg total) by mouth every evening.  Dispense: 90 tablet; Refill: 1                 Follow up in about 3 weeks (around 7/10/2024), or For follow-up regarding PT/OT/Ortho consult/Cardiac work-up.

## 2024-06-19 NOTE — ASSESSMENT & PLAN NOTE
Patient has been referred to Dr. Reyes and has an appointment scheduled for next week. Sensation is returning to the 5th and 4th digits of the left hand. He is still having some numbness over the lateral left UE, likely secondary to the fall and contusion.

## 2024-06-19 NOTE — ASSESSMENT & PLAN NOTE
CT of the lumbar spine performed in the [facility] ER demonstrated mild to moderate foraminal stenoses of L3-L4 through L5-S1 bilaterally. Mild spinal canal stenosis also seen at these levels. Patient states that the back pain is improving, but still present. Will refer the patient to a Spine physician - Abdi Guevara at [facility] in [place], [place] for evaluation. Patient will also start taking Meloxicam daily for pain/inflammation and Flexeril 5mg QHS for muscle spasm.

## 2024-06-24 ENCOUNTER — CLINICAL SUPPORT (OUTPATIENT)
Dept: REHABILITATION | Facility: HOSPITAL | Age: 64
End: 2024-06-24
Payer: OTHER MISCELLANEOUS

## 2024-06-24 DIAGNOSIS — M54.50 ACUTE BILATERAL LOW BACK PAIN WITHOUT SCIATICA: ICD-10-CM

## 2024-06-24 DIAGNOSIS — G56.22 ULNAR NEUROPATHY OF LEFT UPPER EXTREMITY: ICD-10-CM

## 2024-06-24 PROCEDURE — 97165 OT EVAL LOW COMPLEX 30 MIN: CPT

## 2024-06-24 NOTE — PROGRESS NOTES
[facility] Occupational Therapy  Initial Evaluation     Date: 6/24/2024  Name: Mustapha Christina  Clinic Number: [id number]    Therapy Diagnosis: No diagnosis found.  Physician: Sabra Keating DO    Physician Orders: Evaluate and treat.  Medical Diagnosis: Ulnar neuropathy of left upper extremity [G56.22]   Date of Injury/Onset: 6/05/2024  Evaluation Date: 6/24/2024  Insurance Authorization Period Expiration: 6/12/2024 - 6/12/2025   Plan of Care Certification Period: 6/24/2024-9/2/2024      Visit # / Visits authorized: 1 / 12    FOTO: initial eval  Medicare Amount:     Time In:7:15  Time Out: 7:47  Total treatment time: 32minutes    Precautions:  Standard    Subjective     Involved Side: LUE  Dominant Side: Right  Date of Onset: 6/5/2024  History of Current Condition: Pt. Reports on 6/5/2024 he was at work when he fell landing on LUE and back. He went to [facility] ER where xrays were performed but did not reveal any fractures. He was sent home. He currently complains of numbness/tingling in left elbow/hand, however reports its getting better slowly. Reports most of the numbness/tingling is in hand right now. Recently referred to therapy.  Imaging: bone scan films       Past Medical History/Physical Systems Review:   Mustapha Christina  has no past medical history on file.    Mustapha Christina  has no past surgical history on file.    Mustapha has a current medication list which includes the following prescription(s): celecoxib, cephalexin, cyclobenzaprine, methylprednisolone, and rosuvastatin.    Review of patient's allergies indicates:  No Known Allergies     Patient's Goals for Therapy: To be able to use LUE again without pain.    Pain:  Functional Pain Scale Rating 0-10:   6/10 on average  2/10 at best  8/10 at worst  Location: Left elbow/hand  Description: Aching, Dull, Burning, Throbbing, Grabbing, Tight, Tingling, Numb, Sharp, and Electric  Aggravating Factors: Laying, Bending, Touching, Night  Time, Morning, Extension, Flexing, and Lifting  Easing Factors: rest and elevation    Occupation:  Flush   Working presently: employed    Functional Limitations/Social History:    Previous functional status includes: Independent with all ADLs.     Current FunctionalStatus   Home/Living environment : lives with their spouse      Limitation of Functional Status as follows:   ADLs/IADLs:     - Feeding: (I)    - Bathing: (I)    - Dressing/Grooming: (I)    - Driving: (I)             Objective     Observation/Appearance:     Edema. Measured in centimeters.   6/24/2024 6/24/2024    Right Left   2in. Above elbow     2in. Below elbow     Wrist Crease     Distal Palmar Crease     MCPs       Edema. Measured in centimeters.   6/24/2024 6/24/2024    Right Left   Index:       P1      PIP     P2      DIP     P3     Long:       P1      PIP     P2      DIP     P3     Ring:       P1                 PIP                P2                  DIP     P3     Small:        P1                 PIP            P2             DIP     P3     Thumb:     Prox. Phalanx     IP     Distal Phalanx       Elbow and Wrist ROM. Measured in degrees.   6/24/2024 6/24/2024    Right Left   Elbow Ext/Flex 180/130 180/130   Wrist Ext/Flex 61/53 64/60     Hand ROM. Measured in degrees.   6/24/2024 6/24/2024    Right Left        Index: MP  84 76              PIP     102 101              DIP 69 68              JI          Long:  MP 86 91              PIP 96 96              DIP 80 78              JI          Ring:   MP 86 88              PIP 96 94              DIP 75 76              JI          Small:  MP 82 86               PIP 90 86               DIP 73 75              JI          Thumb: MP 49 46                IP 61 56      Strength (Dynamometer)   Measured in pounds.   6/24/2024 6/24/2024    Right Left   Rung II 84 81       Manual Muscle Test   6/24/2024 6/24/2024    Right Left   Wrist Extension  5/5 4/5   Wrist Flexion 5/5 4/5   Elbow  Extension 5/5 4/5   Elbow Flexion 5/5 4/5         Special Tests  Thumb CMC Grind Test    Finkelstein's Test    Phalen's Test    Tinel's Test    Neymar's Test    Willard-Littler Test    Digital Collateral Stress Test    ORL Test    Froment's Sign    Pinch OK Sign    Shama's Sign     Egawa Sign     Clamp Sign     SL Ballottement Test    LT Ballottement Test    Scaphoid/WatsonTest    Linscheid's Test    Metacarpal Stress Test    Piano Key Test    ECU Synergy Test    Ulnar Compression Test    TFCC Load Test    Ulnocarpal Stress Test    Midcarpal Shift Test    Pisiform Boost Test    Elbow Flexion Test    Scratch Collapse Test    Tennis Elbow Test    Resisted Middle Finger Extension Test    Mills Test    Chair Test    Biceps Squeeze Test    Biceps Hook Test    Milking Test    Press Up Manuever    PLRI Test    Valgus Stress Test    Varus Stress Test    Spurling Test    Cervical Distraction Test    ULNTT - General    ULNTT - Median Nerve    ULNTT - Radial Nerve    ULNTT - Ulnar Nerve         CMS Impairment/Limitation/Restriction for FOTO  Survey    Therapist reviewed FOTO scores for Mustapha Christina on 6/24/2024.   FOTO documents entered into [email or web address] - see Media section.    Limitation Score: 55%         Treatment     Treatment Time In: 7:15  Treatment Time Out: 7:47      Mustapha received the following supervised modalities after being cleared for contradictions for  minutes:   -N/A    Mustapha received the following manual therapy techniques for  minutes:   -N/A    Mustapha received therapeutic exercises for  minutes including:  -N/A    Home Exercise Program/Education:  Issued HEP (see patient instructions in EMR) and educated on modality use for pain management . Exercises were reviewed and Mustapha was able to demonstrate them prior to the end of the session.   Pt received a written copy of exercises to perform at home. Mustapha demonstrated good  understanding of the education provided.  Pt was advised to perform these exercises free of  pain, and to stop performing them if pain occurs.    Patient/Family Education: role of OT, goals for OT, scheduling/cancellations - pt verbalized understanding. Discussed insurance limitations with patient.    Additional Education provided:     Assessment     Mustapha Christina is a 63 y.o. male referred to outpatient occupational therapy and presents with a medical diagnosis of LUE ulnar neuropathy, resulting in decreased ROM/strength with increased numbness/tingling and demonstrates limitations as described above. Following medical record review it is determined that pt will benefit from occupational therapy services in order to maximize pain free and/or functional use of left UE. The following goals were discussed with the patient and patient is in agreement with them as to be addressed in the treatment plan. The patient's rehab potential is Good.     Anticipated barriers to occupational therapy:   Pt has no cultural, educational or language barriers to learning provided.        Goals:   The following goals were discussed with the patient and patient is in agreement with them as to be addressed in the treatment plan.   Short term Goals:  1) Initiate HEP  2) Pt will increase AROM of LUE by 5-10 degrees in order to assist with functional full fist by 4 weeks.  3) Pt will reduce edema by .5-1 cm in affected fingers by 4 weeks.  4) Pt will reduce pain to less than 4/10 by 4 weeks.  5) Pt will increase functional  strength by 5# in order to A in opening containers for med management or home management tasks by 4 weeks.   6) Patient will be able to achieve less than or equal to 55% on the FOTO, demonstrating overall improved functional ability with upper extremity. (Self-care category)    Long Term Goals:  Goals to be met by discharge:  1) Independent with HEP  2) Pt will increase LUE JI by 20-30 degrees in order to increase functional fist for grasp with home management or work related tasks by d/c.   3) Pt will  decrease edema to trace or none to increase functional ROM by d/c.   4) Pt will decrease pain to trace or none while completing light home management tasks or work related tasks by d/c.   5) Patient will be able to achieve less than or equal to 65% on the FOTO, demonstrating overall improved functional ability with upper extremity.  (Self-care category)        Plan   Certification Period/Plan of care expiration: 6/24/2024 to 9/2/2024.    Outpatient Occupational Therapy 2 times weekly for 10 weeks to include the following interventions: Paraffin, Fluidotherapy, Manual therapy/joint mobilizations, Modalities for pain management, US 3 mhz, Therapeutic exercises/activities., Iontophoresis with 2.0 cc Dexamethasone, Strengthening, Orthotic Fabrication/Fit/Training, Electrical Modalities, Joint Protection, and Energy Conservation.      MINE GRIFFITH OT M.D.signature_____________________________________________________________________Date___________________________________________

## 2024-06-24 NOTE — PLAN OF CARE
[facility] Occupational Therapy  Initial Evaluation     Date: 6/24/2024  Name: Mustapha Christina  Clinic Number: [id number]    Therapy Diagnosis: No diagnosis found.  Physician: Sabra Keating DO    Physician Orders: Evaluate and treat.  Medical Diagnosis: Ulnar neuropathy of left upper extremity [G56.22]   Date of Injury/Onset: 6/05/2024  Evaluation Date: 6/24/2024  Insurance Authorization Period Expiration: 6/12/2024 - 6/12/2025   Plan of Care Certification Period: 6/24/2024-9/2/2024      Visit # / Visits authorized: 1 / 12    FOTO: initial eval  Medicare Amount:     Time In:7:15  Time Out: 7:47  Total treatment time: 32minutes    Precautions:  Standard    Subjective     Involved Side: LUE  Dominant Side: Right  Date of Onset: 6/5/2024  History of Current Condition: Pt. Reports on 6/5/2024 he was at work when he fell landing on LUE and back. He went to [facility] ER where xrays were performed but did not reveal any fractures. He was sent home. He currently complains of numbness/tingling in left elbow/hand, however reports its getting better slowly. Reports most of the numbness/tingling is in hand right now. Recently referred to therapy.  Imaging: bone scan films       Past Medical History/Physical Systems Review:   Mustapha Christina  has no past medical history on file.    Mustapha Christina  has no past surgical history on file.    Mustapha has a current medication list which includes the following prescription(s): celecoxib, cephalexin, cyclobenzaprine, methylprednisolone, and rosuvastatin.    Review of patient's allergies indicates:  No Known Allergies     Patient's Goals for Therapy: To be able to use LUE again without pain.    Pain:  Functional Pain Scale Rating 0-10:   6/10 on average  2/10 at best  8/10 at worst  Location: Left elbow/hand  Description: Aching, Dull, Burning, Throbbing, Grabbing, Tight, Tingling, Numb, Sharp, and Electric  Aggravating Factors: Laying, Bending, Touching, Night Time,  Morning, Extension, Flexing, and Lifting  Easing Factors: rest and elevation    Occupation:  Fl[employer]   Working presently: employed    Functional Limitations/Social History:    Previous functional status includes: Independent with all ADLs.     Current FunctionalStatus   Home/Living environment : lives with their spouse      Limitation of Functional Status as follows:   ADLs/IADLs:     - Feeding: (I)    - Bathing: (I)    - Dressing/Grooming: (I)    - Driving: (I)             Objective     Observation/Appearance:     Edema. Measured in centimeters.   6/24/2024 6/24/2024    Right Left   2in. Above elbow     2in. Below elbow     Wrist Crease     Distal Palmar Crease     MCPs       Edema. Measured in centimeters.   6/24/2024 6/24/2024    Right Left   Index:       P1      PIP     P2      DIP     P3     Long:       P1      PIP     P2      DIP     P3     Ring:       P1                 PIP                P2                  DIP     P3     Small:        P1                 PIP            P2             DIP     P3     Thumb:     Prox. Phalanx     IP     Distal Phalanx       Elbow and Wrist ROM. Measured in degrees.   6/24/2024 6/24/2024    Right Left   Elbow Ext/Flex 180/130 180/130   Wrist Ext/Flex 61/53 64/60     Hand ROM. Measured in degrees.   6/24/2024 6/24/2024    Right Left        Index: MP  84 76              PIP     102 101              DIP 69 68              JI          Long:  MP 86 91              PIP 96 96              DIP 80 78              JI          Ring:   MP 86 88              PIP 96 94              DIP 75 76              JI          Small:  MP 82 86               PIP 90 86               DIP 73 75              JI          Thumb: MP 49 46                IP 61 56      Strength (Dynamometer)   Measured in pounds.   6/24/2024 6/24/2024    Right Left   Rung II 84 81       Manual Muscle Test   6/24/2024 6/24/2024    Right Left   Wrist Extension  5/5 4/5   Wrist Flexion 5/5 4/5   Elbow Extension 5/5  4/5   Elbow Flexion 5/5 4/5         Special Tests  Thumb CMC Grind Test    Finkelstein's Test    Phalen's Test    Tinel's Test    Neymar's Test    Sharron-Littler Test    Digital Collateral Stress Test    ORL Test    Froment's Sign    Pinch OK Sign    Shama's Sign     Egawa Sign     Clamp Sign     SL Ballottement Test    LT Ballottement Test    Scaphoid/WatsonTest    Linscheid's Test    Metacarpal Stress Test    Piano Key Test    ECU Synergy Test    Ulnar Compression Test    TFCC Load Test    Ulnocarpal Stress Test    Midcarpal Shift Test    Pisiform Boost Test    Elbow Flexion Test    Scratch Collapse Test    Tennis Elbow Test    Resisted Middle Finger Extension Test    Mills Test    Chair Test    Biceps Squeeze Test    Biceps Hook Test    Milking Test    Press Up Manuever    PLRI Test    Valgus Stress Test    Varus Stress Test    Spurling Test    Cervical Distraction Test    ULNTT - General    ULNTT - Median Nerve    ULNTT - Radial Nerve    ULNTT - Ulnar Nerve         CMS Impairment/Limitation/Restriction for FOTO  Survey    Therapist reviewed FOTO scores for Mustapha Christina on 6/24/2024.   FOTO documents entered into [employer] - see Media section.    Limitation Score: 55%         Treatment     Treatment Time In: 7:15  Treatment Time Out: 7:47      Mustapha received the following supervised modalities after being cleared for contradictions for  minutes:   -N/A    Mustapha received the following manual therapy techniques for  minutes:   -N/A    Mustapha received therapeutic exercises for  minutes including:  -N/A    Home Exercise Program/Education:  Issued HEP (see patient instructions in EMR) and educated on modality use for pain management . Exercises were reviewed and Mustapha was able to demonstrate them prior to the end of the session.   Pt received a written copy of exercises to perform at home. Mustapha demonstrated good  understanding of the education provided.  Pt was advised to perform these exercises free of pain, and to  stop performing them if pain occurs.    Patient/Family Education: role of OT, goals for OT, scheduling/cancellations - pt verbalized understanding. Discussed insurance limitations with patient.    Additional Education provided:     Assessment     Mustapha Christina is a 63 y.o. male referred to outpatient occupational therapy and presents with a medical diagnosis of LUE ulnar neuropathy, resulting in decreased ROM/strength with increased numbness/tingling and demonstrates limitations as described above. Following medical record review it is determined that pt will benefit from occupational therapy services in order to maximize pain free and/or functional use of left UE. The following goals were discussed with the patient and patient is in agreement with them as to be addressed in the treatment plan. The patient's rehab potential is Good.     Anticipated barriers to occupational therapy:   Pt has no cultural, educational or language barriers to learning provided.        Goals:   The following goals were discussed with the patient and patient is in agreement with them as to be addressed in the treatment plan.   Short term Goals:  1) Initiate HEP  2) Pt will increase AROM of LUE by 5-10 degrees in order to assist with functional full fist by 4 weeks.  3) Pt will reduce edema by .5-1 cm in affected fingers by 4 weeks.  4) Pt will reduce pain to less than 4/10 by 4 weeks.  5) Pt will increase functional  strength by 5# in order to A in opening containers for med management or home management tasks by 4 weeks.   6) Patient will be able to achieve less than or equal to 55% on the FOTO, demonstrating overall improved functional ability with upper extremity. (Self-care category)    Long Term Goals:  Goals to be met by discharge:  1) Independent with HEP  2) Pt will increase LUE JI by 20-30 degrees in order to increase functional fist for grasp with home management or work related tasks by d/c.   3) Pt will decrease edema  to trace or none to increase functional ROM by d/c.   4) Pt will decrease pain to trace or none while completing light home management tasks or work related tasks by d/c.   5) Patient will be able to achieve less than or equal to 65% on the FOTO, demonstrating overall improved functional ability with upper extremity.  (Self-care category)        Plan   Certification Period/Plan of care expiration: 6/24/2024 to 9/2/2024.    Outpatient Occupational Therapy 2 times weekly for 10 weeks to include the following interventions: Paraffin, Fluidotherapy, Manual therapy/joint mobilizations, Modalities for pain management, US 3 mhz, Therapeutic exercises/activities., Iontophoresis with 2.0 cc Dexamethasone, Strengthening, Orthotic Fabrication/Fit/Training, Electrical Modalities, Joint Protection, and Energy Conservation.      MINE GRIFFITH OT M.D.signature_____________________________________________________________________Date___________________________________________

## 2024-06-25 ENCOUNTER — CLINICAL SUPPORT (OUTPATIENT)
Dept: REHABILITATION | Facility: HOSPITAL | Age: 64
End: 2024-06-25
Payer: OTHER MISCELLANEOUS

## 2024-06-25 DIAGNOSIS — G56.22 ULNAR NEUROPATHY OF LEFT UPPER EXTREMITY: ICD-10-CM

## 2024-06-25 DIAGNOSIS — M54.50 ACUTE BILATERAL LOW BACK PAIN WITHOUT SCIATICA: Primary | ICD-10-CM

## 2024-06-25 DIAGNOSIS — R29.898 WEAKNESS OF BACK: ICD-10-CM

## 2024-06-25 PROCEDURE — 97162 PT EVAL MOD COMPLEX 30 MIN: CPT

## 2024-06-25 PROCEDURE — 97110 THERAPEUTIC EXERCISES: CPT

## 2024-06-25 NOTE — PROGRESS NOTES
See Plan of Care     Sup Visit performed today with CAROL Johnson and CAROL Schmid.  All goals and treatment plan reviewed. Will work toward completion of all goals set.    First Treatment May Include :     Teach patient the basic back Home Exercise Program and progress from there         Back Exercises    Bike/NuStep                 Calf Stretch    Hamstring Stretch    Pelvic Tilts    Bridging    Bridging with Abduction    Bridging/Hooklying with Marching    Bridging/Hooklying with Knee Ext    Trunk Rotation Exercise    Trunk Rotation Stretch    Ball - Trunk Rotation    Ball- Knee Extension    Planks    Quadruped

## 2024-06-25 NOTE — PLAN OF CARE
[facility]   Physical Therapy Initial Evaluation      Name: Mustapha Lane [facility] Number: [id number]    Therapy Diagnosis:   Encounter Diagnoses   Name Primary?    Acute bilateral low back pain without sciatica     Ulnar neuropathy of left upper extremity         Physician: Sabra Keating DO    Physician Orders: PT Eval and Treat   Medical Diagnosis from Referral: Low Back Pain   Evaluation Date: 6/25/2024  Authorization Period Expiration: 6/14/2025  Plan of Care Expiration: 8/23/2024   Visit # / Visits authorized: 1/ 12   FOTO: 51/100    Precautions: Standard     Time In: 7:50 am   Time Out: 8:45 am   Total Appointment Time (timed & untimed codes): 55 minutes    Subjective     Date of onset: 6/5/2024    History of current condition - Mustapha reports: 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 He does not know what happened to cause the fall. He landed on his back and Left Upper Extremity causing pain to both the back and the Left arm. He is having numbness and tingling in the Left hand. OT is addressing this. He is having low back pain that worsens with flexion. He denies radiculopathy at this time. MD ordered Outpatient Physical Therapy and patient is here today for the Evaluation.        Falls: 6/5/2024    Imaging: CT scan films:   CT Lumbar spine at [facility] 6/5/2024: Mild to moderate foraminal stenoses L3-L4 through L5-S1 bilaterally. Suspect mild spinal canal stenosis at these levels as well  CT of the lumbar spine performed in the [facility] [place] ER demonstrated mild to moderate foraminal stenoses of L3-L4 through L5-S1 bilaterally.   Mild spinal canal stenosis also seen at these levels.   A:Patient states that the back pain is improving, but still present. Taking medrol dose [facility] never did fill his toradol or his muscle relaxant.  T:Will refer the patient to a Spine physician - Abdi Guevara at [facility] in [place], [place] for evaluation.   Patient will  "also start taking Stronger NSAID(failed wife's OTC naproxen) daily for pain/inflammation and Flexeril 5mg QHS for muscle spasm.        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 [employer]   Prior Level of Function: Independent and Active   Current Level of Function: Back pain limits all mobility     Pain:  Current 5/10, worst 8/10, best 2/10   Location: Bilateral Low Back     Description: Aching, Dull, Sharp, and Shooting  Aggravating Factors: Prolonged Sitting, Standing, and Walking; Flexing Forward, Lifting, and Getting out of bed/chair  Easing Factors: relaxation, pain medication, heating pad, hot bath, and restchanging position, took steroids when it first happened, prescription pain pill at night     Patients goals: "I want to figure out what is wrong with me."     Medical History:   No past medical history on file.    Surgical History:   Mustapha Christina  has no past surgical history on file.    Medications:   Mustapha has a current medication list which includes the following prescription(s): celecoxib, cephalexin, cyclobenzaprine, methylprednisolone, and rosuvastatin.    Allergies:   Review of patient's allergies indicates:  No Known Allergies     Objective        Posture :     Standing Lordosis        []  Increased   [x]   Decreased   []  Within Normal Limits  Sitting Lordosis  []  Increased   [x]   Decreased   []  Within Normal Limits   Iliac Crest Height  []  Left/Right Increased      [x] Equal/Level  PSIS Height    []  Left/Right Increased      [x] Equal/Level  Pelvic Rot/Torsion       []   Yes     [x]   No  Scoliosis    []  Yes   Concave Right/Left      [x]  No  Lateral Shift    []   Right     []   Left          [x]  Within Normal Limits      Strength :      Myotome/Muscle :  Left   Right     L1-2    Psoas  4/5  4/5    L3       Quadriceps  5/5  5/5    L4       Anterior Tibialis  5/5  5/5    " L5       EHL   5/5  5/5    S1      Gastocnemius  5/5  5/5    S2      Hamstrings  4/5  4/5                    Strength :   Abdominals  3/5  Back Extensors 3/5  Multifidus 3/5      Range of Motion :     Back :    Forward Flexion 45 degrees and painful    Back Bending 20 degrees    Side Bending Left 25 degrees    Side Bending Right 25 degrees    Rotation Left 40 degrees    Rotation Right 40 degrees     Hip :   Hamstrings : Tight Bilaterally   Piriformis : Tight Bilaterally       Special Tests :     SLR :   Right   +/- <60 Degrees     []   yes   [x]  No  ;   +/-  60-90 Degrees     []   Yes    [x]  No   Left     +/- <60 Degrees      []  yes    [x] No ;   +/-  60-90 Degrees       []   Yes    [x] No    Sitting Slump Test :  Left : [] Positive   [x]  Negative ;  Right : [] Positive   [x]  Negative   Lower Extremity Length :   [] Right/Left Longer     [x]  Within Normal Limits   MASON : Left : [] Positive   [x]  Negative ;  Right : [] Positive   [x]  Negative       Gait Assessment : [place] and is slow and patient is guarded with ambulation with little arm swing noted.       Dermatome : reports no numbness or tingling in the Lower Extremities at this time       Palpation : muscle spasms noted in lumbar spine              Limitation/Restriction for FOTO Intake Lumbar Survey    Therapist reviewed FOTO scores for Mustapha Christina on 6/25/2024.   FOTO documents entered into [employer] - see Media section.    Limitation Score: 49%         Treatment     Total Treatment time (time-based codes) separate from Evaluation: 10 minutes       Mustapha received the treatments listed below:  THERAPEUTIC EXERCISES to develop ROM, flexibility, and posture for 10 minutes including :  Therapist initiated the Basic Home Stretching program today that includes Low Back Stretch, Single Knee to Chest, Double Knee to Chest, Piriformis Stretch, and Hamstring Stretch.     Patient Education and Home Exercises     Education provided:   - Discussed the findings  from the Evaluation, Reviewed the Plan of Care, and Instructed patient on their Home Exercise Program       Written Home Exercises Provided: yes. Exercises were reviewed and Mustapha was able to demonstrate them prior to the end of the session.  Mustapha demonstrated good  understanding of the education provided. See EMR under Patient Instructions for exercises provided during therapy sessions.    Assessment     Mustapha is a 63 y.o. male referred to outpatient Physical Therapy with a medical diagnosis of Low Back Pain. 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 He does not know what happened to cause the fall. He landed on his back and Left Upper Extremity causing pain to both the back and the Left arm. He is having numbness and tingling in the Left hand. OT is addressing this. He is having low back pain that worsens with flexion. He denies radiculopathy at this time. MD ordered Outpatient Physical Therapy and patient is here today for the Evaluation.   Patient presents with decreased lumbar lordosis and a flattening of the lumbar spine most likely due to degenerative changes in the lumbar spine. CT scan showed spinal canal and foraminal stenoses of L3-L4 through L5-S1 bilaterally. Unable to rule out disc pathology at this time, but he does not have any radicular symptoms and he was negative for SLR test and slump test which is good. He does have muscle spasms noted throughout the lumbar spine. Hip and Hamstring strength is slightly weaker than than other Lower Extremity musculature but overall good strength noted. His lumbosacral mobility is limited in all directions with forward flexion being the most painful.   Patient will benefit from skilled Physical Therapy intervention to address all deficits and help patient to return to their prior level of function with referral back to MD at completion of Therapy if pain and dysfunction persist.         Patient prognosis is Fair.   Patient will benefit  from skilled outpatient Physical Therapy to address the deficits stated above and in the chart below, provide patient /family education, and to maximize patientt's level of independence.     Plan of care discussed with patient: Yes  Patient's spiritual, cultural and educational needs considered and patient is agreeable to the plan of care and goals as stated below:     Anticipated Barriers for therapy: CT of the lumbar spine performed in the [facility] ER demonstrated mild to moderate foraminal stenoses of L3-L4 through L5-S1 bilaterally.   Mild spinal canal stenosis also seen at these levels.     Medical Necessity is demonstrated by the following  History  Co-morbidities and personal factors that may impact the plan of care [] LOW: no personal factors / co-morbidities  [x] MODERATE: 1-2 personal factors / co-morbidities  [] HIGH: 3+ personal factors / co-morbidities    Moderate / High Support Documentation:   Co-morbidities affecting plan of care:   Arthritis, GERD      Personal Factors:   no deficits     Examination  Body Structures and Functions, activity limitations and participation restrictions that may impact the plan of care [] LOW: addressing 1-2 elements  [x] MODERATE: 3+ elements  [] HIGH: 4+ elements (please support below)    Moderate / High Support Documentation: decreased lumbar lordosis and a flattening of the lumbar spine most likely due to degenerative changes in the lumbar spine. CT scan showed spinal canal and foraminal stenoses of L3-L4 through L5-S1 bilaterally. Unable to rule out disc pathology at this time, but he does not have any radicular symptoms and he was negative for SLR test and slump test which is good. He does have muscle spasms noted throughout the lumbar spine. Hip and Hamstring strength is slightly weaker than than other Lower Extremity musculature but overall good strength noted. His lumbosacral mobility is limited in all directions with forward flexion being the most painful.       Clinical Presentation [] LOW: stable  [x] MODERATE: Evolving - Will likely require additional testing at the completion of Physical Therapy to determine the exact cause of patient's pain and dysfunction    [] HIGH: Unstable     Decision Making/ Complexity Score: moderate         Goals:  Short Term Goals: 4 weeks   1. Patient will be Independent with Home Exercise Program   2. Patient will demonstrate with improved Posture and Body Mechanics  3. Patient will increase Lumbosacral Range of Motion by 25%   4. Patient will increase Lower Extremity and Core Strength to grossly 4+/5  5. Patient will decrease complaints of pain with activity to Less than or Equal to 5/10     Long Term Goals: 8 weeks   1. Patient will tolerate 30 minutes of activity with complaints of Pain at Less Than or Equal to 4/10      Plan     Plan of care Certification: 6/25/2024 to 8/23/2024.    Outpatient Physical Therapy 2 times weekly for 8 weeks to include the following interventions: 67858 [therapeutic exercise], 51444 [neuromuscular re-education], 77113 [manual therapy], 05722 [therapeutic activities], 43716 [unattended electrical stimulation], and 09882 [mechanical traction]    ERIKA JACOB, PT, DPT

## 2024-06-26 ENCOUNTER — OFFICE VISIT (OUTPATIENT)
Dept: ORTHOPEDICS | Facility: CLINIC | Age: 64
End: 2024-06-26
Payer: OTHER MISCELLANEOUS

## 2024-06-26 ENCOUNTER — HOSPITAL ENCOUNTER (OUTPATIENT)
Dept: RADIOLOGY | Facility: HOSPITAL | Age: 64
Discharge: HOME | End: 2024-06-26
Attending: ORTHOPAEDIC SURGERY
Payer: OTHER MISCELLANEOUS

## 2024-06-26 ENCOUNTER — HOSPITAL ENCOUNTER (OUTPATIENT)
Dept: RADIOLOGY | Facility: HOSPITAL | Age: 64
Discharge: HOME | End: 2024-06-26
Attending: FAMILY MEDICINE
Payer: OTHER MISCELLANEOUS

## 2024-06-26 DIAGNOSIS — R55 SYNCOPE, UNSPECIFIED SYNCOPE TYPE: ICD-10-CM

## 2024-06-26 DIAGNOSIS — G56.22 ULNAR NEUROPATHY OF LEFT UPPER EXTREMITY: Primary | ICD-10-CM

## 2024-06-26 DIAGNOSIS — G56.22 ULNAR NEUROPATHY OF LEFT UPPER EXTREMITY: ICD-10-CM

## 2024-06-26 PROCEDURE — 73070 X-RAY EXAM OF ELBOW: CPT | Mod: TC,LT

## 2024-06-26 PROCEDURE — 99203 OFFICE O/P NEW LOW 30 MIN: CPT | Mod: S$PBB,,, | Performed by: ORTHOPAEDIC SURGERY

## 2024-06-26 PROCEDURE — 93880 EXTRACRANIAL BILAT STUDY: CPT | Mod: 26,,, | Performed by: RADIOLOGY

## 2024-06-26 PROCEDURE — 93880 EXTRACRANIAL BILAT STUDY: CPT | Mod: TC

## 2024-06-26 PROCEDURE — 99213 OFFICE O/P EST LOW 20 MIN: CPT | Mod: PBBFAC,25 | Performed by: ORTHOPAEDIC SURGERY

## 2024-06-26 PROCEDURE — 99999 PR PBB SHADOW E&M-EST. PATIENT-LVL III: CPT | Mod: PBBFAC,,, | Performed by: ORTHOPAEDIC SURGERY

## 2024-06-26 RX ORDER — MELOXICAM 7.5 MG/1
7.5 TABLET ORAL DAILY
Qty: 30 TABLET | Refills: 1 | Status: SHIPPED | OUTPATIENT
Start: 2024-06-26

## 2024-06-26 NOTE — PROGRESS NOTES
Radiology Interpretation        Patient Name: Mustapha Christina  Date: 6/26/2024  YOB: 1960  MRN# [id number]        ORDERING DIAGNOSIS:    Encounter Diagnosis   Name Primary?    Ulnar neuropathy of left upper extremity         Two views left elbow skeletally mature individual AP lateral elbow shows no fracture subluxation no bony lesions impression no acute bony abnormalities left elbow               Mustapha Reyes MD

## 2024-06-26 NOTE — PROGRESS NOTES
Clinic Note        CC:   Chief Complaint   Patient presents with    Left Elbow - Pain        Principal problem: No primary diagnosis found.     REASON FOR VISIT:       HISTORY:  63-year-old male right-hand dominant male who sustained an injury to his left elbow when he was getting out of a truck fell down striking in his elbow since that time he has had tingling and pain in his ulnar distribution on left arm.  X-rays show no fracture.      PAST MEDICAL HISTORY: History reviewed. No pertinent past medical history.       PAST SURGICAL HISTORY: History reviewed. No pertinent surgical history.       ALLERGIES: Review of patient's allergies indicates:  No Known Allergies     MEDICATIONS :    Current Outpatient Medications:     celecoxib (CELEBREX) 200 MG capsule, Take 1 capsule (200 mg total) by mouth once daily., Disp: 30 capsule, Rfl: 1    cephALEXin (KEFLEX) 750 MG capsule, Take 750 mg by mouth 2 (two) times daily., Disp: , Rfl:     cyclobenzaprine (FLEXERIL) 5 MG tablet, Take 1 tablet (5 mg total) by mouth nightly., Disp: 60 tablet, Rfl: 0    methylPREDNISolone (MEDROL DOSEPACK) 4 mg tablet, use as directed, Disp: 21 each, Rfl: 0    rosuvastatin (CRESTOR) 10 MG tablet, Take 1 tablet (10 mg total) by mouth every evening., Disp: 90 tablet, Rfl: 1     SOCIAL HISTORY:   Social History     Socioeconomic History    Marital status:    Tobacco Use    Smoking status: Never    Smokeless tobacco: Never   Substance and Sexual Activity    Alcohol use: Not Currently    Drug use: Never    Sexual activity: Yes          FAMILY HISTORY: No family history on file.       PHYSICAL EXAM:  In general, this is a well-developed, well-nourished male . The patient is alert, oriented and cooperative.      HEENT:  Normocephalic, atraumatic.  Extraocular movements are intact bilaterally.  The oropharynx is benign.       NECK:  Nontender with good range of motion.      LUNGS:  Clear to auscultation bilaterally.      HEART:   Demonstrates a regular rate and rhythm.  No murmurs appreciated.      ABDOMEN:  Soft, non-tender, non-distended.        EXTREMITIES:  Left upper extremity moves his fingers he has sensation slightly decreased in the ulnar distribution compared radial median.  He can abduct and adduct his fingers but he has weakness on abduction.  Positive Tinel's at the elbow.      RADIOGRAPHIC FINDINGS:  X-rays left elbow show no fracture subluxation      IMPRESSION: Ulnar neuropathy of left upper extremity  -     Ambulatory referral/consult to Orthopedics         PLAN:  He has some ulnar neuropathy and neuritis.  I am going to start him on some Mobic.  No use of his left arm at work.  I will follow him up in a month.  If he does not start to resolve his symptoms more then we will get mg but at this time think he has contusion of the ulnar nerve this should resolve over time.  There are no Patient Instructions on file for this visit.      No follow-ups on file.         Mustapha Reyes      (Subject to voice recognition error, transcription service not allowed)

## 2024-06-26 NOTE — LETTER
June 26, 2024      [facility]  [address] [place] [id number]  Phone: [phone]  Fax: [phone]       Patient: Mustapha Christina   YOB: 1960  Date of Visit: 06/26/2024    To Whom It May Concern:    Kinza Christina  was at [facility] on 06/26/2024. The patient may return to work/school on 06/27/2024 with restrictions of no use of left upper extremity. If you have any questions or concerns, or if I can be of further assistance, please do not hesitate to contact me.    Sincerely,    MD Isidro Reese MA

## 2024-06-26 NOTE — PROGRESS NOTES
Radiology Interpretation        Patient Name: Mustapha Christina  Date: 6/26/2024  YOB: 1960  MRN# [id number]        ORDERING DIAGNOSIS:    Encounter Diagnosis   Name Primary?    Ulnar neuropathy of left upper extremity     Two views left elbow skeletally mature individual there has normal mineralization no fractures or subluxations no bony lesions impression acute bony abnormalities left elbow                   Mustapha Reyes MD

## 2024-06-27 ENCOUNTER — HOSPITAL ENCOUNTER (OUTPATIENT)
Dept: CARDIOLOGY | Facility: HOSPITAL | Age: 64
Discharge: HOME | End: 2024-06-27
Attending: FAMILY MEDICINE
Payer: OTHER MISCELLANEOUS

## 2024-06-27 VITALS — HEIGHT: 68 IN | WEIGHT: 179 LBS | BODY MASS INDEX: 27.13 KG/M2

## 2024-06-27 DIAGNOSIS — R55 SYNCOPE, UNSPECIFIED SYNCOPE TYPE: ICD-10-CM

## 2024-06-27 DIAGNOSIS — R55 SYNCOPE AND COLLAPSE: ICD-10-CM

## 2024-06-27 LAB
AORTIC ROOT ANNULUS: 2.88 CM
AORTIC VALVE CUSP SEPERATION: 2.11 CM
AV INDEX (PROSTH): 0.78
AV MEAN GRADIENT: 4 MMHG
AV PEAK GRADIENT: 9 MMHG
AV VALVE AREA BY VELOCITY RATIO: 2.63 CM²
AV VALVE AREA: 2.42 CM²
AV VELOCITY RATIO: 0.84
BSA FOR ECHO PROCEDURE: 1.97 M2
CV ECHO LV RWT: 0.29 CM
DOP CALC AO PEAK VEL: 1.48 M/S
DOP CALC AO VTI: 32.2 CM
DOP CALC LVOT AREA: 3.1 CM2
DOP CALC LVOT DIAMETER: 1.99 CM
DOP CALC LVOT PEAK VEL: 1.25 M/S
DOP CALC LVOT STROKE VOLUME: 78.03 CM3
DOP CALCLVOT PEAK VEL VTI: 25.1 CM
E WAVE DECELERATION TIME: 181.39 MSEC
E/A RATIO: 0.98
E/E' RATIO: 6.23 M/S
ECHO LV POSTERIOR WALL: 0.73 CM (ref 0.6–1.1)
FRACTIONAL SHORTENING: 60 % (ref 28–44)
INTERVENTRICULAR SEPTUM: 0.7 CM (ref 0.6–1.1)
IVC DIAMETER: 1.58 CM
LEFT ATRIUM AREA SYSTOLIC (APICAL 2 CHAMBER): 18.68 CM2
LEFT ATRIUM AREA SYSTOLIC (APICAL 4 CHAMBER): 17.85 CM2
LEFT ATRIUM VOLUME INDEX MOD: 22.8 ML/M2
LEFT ATRIUM VOLUME MOD: 44.45 CM3
LEFT INTERNAL DIMENSION IN SYSTOLE: 1.97 CM (ref 2.1–4)
LEFT VENTRICLE DIASTOLIC VOLUME INDEX: 60.17 ML/M2
LEFT VENTRICLE DIASTOLIC VOLUME: 117.33 ML
LEFT VENTRICLE END SYSTOLIC VOLUME APICAL 2 CHAMBER: 47.4 ML
LEFT VENTRICLE END SYSTOLIC VOLUME APICAL 4 CHAMBER: 41.43 ML
LEFT VENTRICLE MASS INDEX: 60 G/M2
LEFT VENTRICLE SYSTOLIC VOLUME INDEX: 6.2 ML/M2
LEFT VENTRICLE SYSTOLIC VOLUME: 12.18 ML
LEFT VENTRICULAR INTERNAL DIMENSION IN DIASTOLE: 4.98 CM (ref 3.5–6)
LEFT VENTRICULAR MASS: 116.97 G
LV LATERAL E/E' RATIO: 6.23 M/S
LV SEPTAL E/E' RATIO: 6.23 M/S
LVED V (TEICH): 117.33 ML
LVES V (TEICH): 12.18 ML
LVOT MG: 2.83 MMHG
LVOT MV: 0.77 CM/S
MV PEAK A VEL: 0.83 M/S
MV PEAK E VEL: 0.81 M/S
MV STENOSIS PRESSURE HALF TIME: 52.6 MS
MV VALVE AREA P 1/2 METHOD: 4.18 CM2
OHS LV EJECTION FRACTION SIMPSONS BIPLANE MOD: 60 %
PISA MRMAX VEL: 3.16 M/S
PISA TR MAX VEL: 2.82 M/S
RA PRESSURE ESTIMATED: 3 MMHG
RA VOL SYS: 51.38 ML
RIGHT ATRIAL AREA: 19.2 CM2
RIGHT ATRIUM VOLUME AREA LENGTH APICAL 4 CHAMBER: 48.17 ML
RIGHT VENTRICLE DIASTOLIC BASEL DIMENSION: 3.3 CM
RIGHT VENTRICLE DIASTOLIC LENGTH: 7 CM
RIGHT VENTRICLE DIASTOLIC MID DIMENSION: 2.3 CM
RIGHT VENTRICULAR LENGTH IN DIASTOLE (APICAL 4-CHAMBER VIEW): 7.02 CM
RV MID DIAMA: 2.26 CM
RV TB RVSP: 6 MMHG
TDI LATERAL: 0.13 M/S
TDI SEPTAL: 0.13 M/S
TDI: 0.13 M/S
TR MAX PG: 32 MMHG
TV REST PULMONARY ARTERY PRESSURE: 35 MMHG
Z-SCORE OF LEFT VENTRICULAR DIMENSION IN END DIASTOLE: -1.1
Z-SCORE OF LEFT VENTRICULAR DIMENSION IN END SYSTOLE: -4.36

## 2024-06-27 PROCEDURE — 93306 TTE W/DOPPLER COMPLETE: CPT

## 2024-06-27 PROCEDURE — 93306 TTE W/DOPPLER COMPLETE: CPT | Mod: 26,,, | Performed by: STUDENT IN AN ORGANIZED HEALTH CARE EDUCATION/TRAINING PROGRAM

## 2024-06-27 PROCEDURE — 93005 ELECTROCARDIOGRAM TRACING: CPT

## 2024-06-28 NOTE — PROGRESS NOTES
ECHO is normal with an EF of 60%. No abnormal valve findings. Recent syncopal episode is not due to anatomic/valvular abnormalities.

## 2024-06-28 NOTE — PROGRESS NOTES
No convincing evidence of significant narrowing of either internal carotid artery. Carotid artery stenosis is not the cause of the patient's recent syncope episode.

## 2024-07-02 ENCOUNTER — CLINICAL SUPPORT (OUTPATIENT)
Dept: REHABILITATION | Facility: HOSPITAL | Age: 64
End: 2024-07-02
Payer: OTHER MISCELLANEOUS

## 2024-07-02 DIAGNOSIS — R29.898 WEAKNESS OF BACK: ICD-10-CM

## 2024-07-02 DIAGNOSIS — M54.50 ACUTE BILATERAL LOW BACK PAIN WITHOUT SCIATICA: Primary | ICD-10-CM

## 2024-07-02 PROCEDURE — 97110 THERAPEUTIC EXERCISES: CPT

## 2024-07-02 NOTE — PROGRESS NOTES
[facility]   Physical Therapy Treatment Note     Name: Mustapha Christina  Clinic Number: [id number]    Therapy Diagnosis:   Encounter Diagnoses   Name Primary?    Acute bilateral low back pain without sciatica Yes    Weakness of back      Physician: Sabra Keating DO    Visit Date: 7/2/2024    Physician Orders: PT Eval and Treat   Medical Diagnosis from Referral: Low Back Pain   Evaluation Date: 6/25/2024  Authorization Period Expiration: 6/14/2025  Plan of Care Expiration: 8/23/2024   Visit # / Visits authorized: 2/ 12   FOTO: 51/100       PTA Visit #: 0/5    Time In: 7:38 am   Time Out: 8:33 am  Total Billable Time: 55 minutes    Precautions: Standard  Functional Level at time of Evaluation: Back pain limits all mobility    Subjective     Pt reports: His back pain is starting to ease up a little.  He was compliant with home exercise program.    Pain: 3/10  Location: Low Back      Objective       Mustapha received therapeutic exercises to develop strength, endurance, ROM, flexibility, posture, and core stabilization for 55 minutes including:    Back Exercises     Bike/NuStep       Bike 5 Min           Calf Stretch  4 x 15 sec hold    Hamstring Stretch  4 x 15 sec hold    Piriformis Stretch   4 x 15 sec hold        Pelvic Tilts  x 10   Bridging  x 10    Bridging with Abduction     Bridging/Hooklying with Marching  x 10    Bridging/Hooklying with Knee Ext  x 10    Trunk Rotation Exercise  x 10    Trunk Rotation Stretch  4 x 15 sec hold   Ball - Trunk Rotation     Ball- Knee Extension     Planks  5 x 5 sec hold    Quadruped  x 5 each               Mustapha received the following manual therapy techniques: Joint mobilizations, Manual traction, Soft tissue Mobilization, and Friction Massage were applied to the: Low Back for 0 minutes, including:      Mustapha participated in neuromuscular re-education activities to improve: Balance, Coordination, Sense, Proprioception, and Posture for 0 minutes.  The following activities were included:      Mustapha participated in dynamic functional therapeutic activities to improve functional performance for 0  minutes, including:              Home Exercises Provided and Patient Education Provided     Education provided: Therapist initiated patient's Home Exercise Program today. Patient was given a written copy of the Home Exercise Program with pictures and written instructions for each exercise.  Instructed patient on proper form for all exercises and had patient return demonstration.       Written Home Exercises Provided: yes.  Exercises were reviewed and Mustapha was able to demonstrate them prior to the end of the session.  Mustapha demonstrated good  understanding of the education provided.     See EMR under Patient Instructions for exercises provided 7/2/2024.    Assessment     Today is the first treatment since the Evaluation. Therapist initiated the Plan of Care and instructed patient on proper form for all exercises. We reviewed the Home Stretching program he was given at time of Evaluation and therapist initiated patient's Home Exercise Program today. Patient was given a written copy of the Home Exercise Program with pictures and written instructions for each exercise.  Instructed patient on proper form for all exercises and had patient return demonstration.   Patient did well with this and therapist was able to give him the more advanced back program. He will practice this at home and return to Therapy with any questions or concerns.   Sup Visit performed today with CAROL Johnson and CAROL Schmid.  All goals and treatment plan reviewed. Will work toward completion of all goals set.                PMH at time of Evaluation : Mustapha is a 63 y.o. male referred to outpatient Physical Therapy with a medical diagnosis of Low Back Pain. 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 He does not know what happened to cause the fall. He landed  on his back and Left Upper Extremity causing pain to both the back and the Left arm. He is having numbness and tingling in the Left hand. OT is addressing this. He is having low back pain that worsens with flexion. He denies radiculopathy at this time. MD ordered Outpatient Physical Therapy and patient is here today for the Evaluation.   Patient presents with decreased lumbar lordosis and a flattening of the lumbar spine most likely due to degenerative changes in the lumbar spine. CT scan showed spinal canal and foraminal stenoses of L3-L4 through L5-S1 bilaterally. Unable to rule out disc pathology at this time, but he does not have any radicular symptoms and he was negative for SLR test and slump test which is good. He does have muscle spasms noted throughout the lumbar spine. Hip and Hamstring strength is slightly weaker than than other Lower Extremity musculature but overall good strength noted. His lumbosacral mobility is limited in all directions with forward flexion being the most painful.   Patient will benefit from skilled Physical Therapy intervention to address all deficits and help patient to return to their prior level of function with referral back to MD at completion of Therapy if pain and dysfunction persist.       Mustapah Is progressing well towards his goals.   Pt prognosis is Good.     Pt will continue to benefit from skilled outpatient physical therapy to address the deficits listed in the problem list box on initial evaluation, provide pt/family education and to maximize pt's level of independence in the home and community environment.     Pt's spiritual, cultural and educational needs considered and pt agreeable to plan of care and goals.     Anticipated Barriers for therapy: CT of the lumbar spine performed in the [facility] demonstrated mild to moderate foraminal stenoses of L3-L4 through L5-S1 bilaterally. Mild spinal canal stenosis also seen at these levels.     Goals:  Short Term Goals: 4 weeks    1. Patient will be Independent with Home Exercise Program   2. Patient will demonstrate with improved Posture and Body Mechanics  3. Patient will increase Lumbosacral Range of Motion by 25%   4. Patient will increase Lower Extremity and Core Strength to grossly 4+/5  5. Patient will decrease complaints of pain with activity to Less than or Equal to 5/10      Long Term Goals: 8 weeks   1. Patient will tolerate 30 minutes of activity with complaints of Pain at Less Than or Equal to 4/10         Plan     Plan of care Certification: 6/25/2024 to 8/23/2024.     Outpatient Physical Therapy 2 times weekly for 8 weeks to include the following interventions: 81915 [therapeutic exercise], 13701 [neuromuscular re-education], 42012 [manual therapy], 06485 [therapeutic activities], 70891 [unattended electrical stimulation], and 12105 [mechanical traction]       ERIKA JACOB, PT, DPT   7/2/2024

## 2024-07-08 ENCOUNTER — CLINICAL SUPPORT (OUTPATIENT)
Dept: REHABILITATION | Facility: HOSPITAL | Age: 64
End: 2024-07-08
Payer: OTHER MISCELLANEOUS

## 2024-07-08 ENCOUNTER — OFFICE VISIT (OUTPATIENT)
Dept: FAMILY MEDICINE | Facility: CLINIC | Age: 64
End: 2024-07-08
Payer: COMMERCIAL

## 2024-07-08 VITALS
HEART RATE: 74 BPM | RESPIRATION RATE: 18 BRPM | HEIGHT: 68 IN | OXYGEN SATURATION: 97 % | WEIGHT: 180 LBS | DIASTOLIC BLOOD PRESSURE: 77 MMHG | SYSTOLIC BLOOD PRESSURE: 127 MMHG | BODY MASS INDEX: 27.28 KG/M2
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
## 2024-07-08 DIAGNOSIS — G56.22 ULNAR NEUROPATHY OF LEFT UPPER EXTREMITY: Primary | ICD-10-CM

## 2024-07-08 DIAGNOSIS — M54.50 ACUTE LOW BACK PAIN WITHOUT SCIATICA, UNSPECIFIED BACK PAIN LATERALITY: Primary | ICD-10-CM

## 2024-07-08 PROCEDURE — 97018 PARAFFIN BATH THERAPY: CPT

## 2024-07-08 PROCEDURE — 97140 MANUAL THERAPY 1/> REGIONS: CPT

## 2024-07-08 PROCEDURE — 3078F DIAST BP <80 MM HG: CPT | Mod: CPTII,,, | Performed by: FAMILY MEDICINE

## 2024-07-08 PROCEDURE — 97110 THERAPEUTIC EXERCISES: CPT

## 2024-07-08 PROCEDURE — 99214 OFFICE O/P EST MOD 30 MIN: CPT | Mod: ,,, | Performed by: FAMILY MEDICINE

## 2024-07-08 PROCEDURE — 1160F RVW MEDS BY RX/DR IN RCRD: CPT | Mod: CPTII,,, | Performed by: FAMILY MEDICINE

## 2024-07-08 PROCEDURE — 1159F MED LIST DOCD IN RCRD: CPT | Mod: CPTII,,, | Performed by: FAMILY MEDICINE

## 2024-07-08 PROCEDURE — 3008F BODY MASS INDEX DOCD: CPT | Mod: CPTII,,, | Performed by: FAMILY MEDICINE

## 2024-07-08 PROCEDURE — 3044F HG A1C LEVEL LT 7.0%: CPT | Mod: CPTII,,, | Performed by: FAMILY MEDICINE

## 2024-07-08 PROCEDURE — 3074F SYST BP LT 130 MM HG: CPT | Mod: CPTII,,, | Performed by: FAMILY MEDICINE

## 2024-07-08 NOTE — ASSESSMENT & PLAN NOTE
07/08  - Patient for a two-week follow up from last visit regarding a fall/injury that was work related  - Reports improving and is ready to return back to work  -States seeing PT and Orthopedics for strength and left ulnar discomfort  -States Naproxen is working with the pain   - Referral place for lumbar spinal discomfort; awaiting appointment  - At this time, patient will be cleared back to work on 07/24/2024; without restrictions   - Xray of the sacrum pending radiology interpretation

## 2024-07-08 NOTE — PROGRESS NOTES
[facility]  Occupational Therapy Treatment Note     Date: 7/8/2024  Name: Mustapha Christina  Clinic Number: [id number]    Therapy Diagnosis: No diagnosis found.  Physician: Sabra Keating DO    Physician Orders: Evaluate and treat.  Medical Diagnosis: Ulnar neuropathy of left upper extremity [G56.22]   Date of Injury/Onset: 6/05/2024  Evaluation Date: 6/24/2024  Insurance Authorization Period Expiration: 6/12/2024 - 6/12/2025   Plan of Care Certification Period: 6/24/2024-9/2/2024        Visit # / Visits authorized: 2/ 12     FOTO: initial eval  Medicare Amount:      Time In:3:38  Time Out: 4:38  Total treatment time: 60 minutes     Precautions:  Standard      Subjective     Patient reports: he has been performing exercises at home.  He was compliant with home exercise program given last session.   Response to previous treatment:  Functional change:     Pain: 0/10  Location: left elbow , fingers , hands , and wrists      Objective     Objective Measures updated at progress report unless specified.    Treatment     Mustapha received the treatments listed below:      therapeutic exercises to develop strength, endurance, ROM, and flexibility for 30 minutes including:  -LUE AROM- wrist flx/ext db- 3x10, Handgripper-6x10  , therabar-3x10  , power web flx/ext- 3x10 , digi flx-  3x10    manual therapy techniques: Joint mobilizations, Manual traction, Myofacial release, Soft tissue Mobilization, and Friction Massage were applied to the: LUE for 20 minutes, including:  -LUE PROM- wrist flx/ext- 2x10, joint mobz/distraction- 2x10, tendon glides- 2x5      therapeutic activities to improve functional performance for   minutes, including:  N/A      direct contact modalities after being cleared for contraindications: N/A    supervised modalities after being cleared for contradictions: Paraffin-10min.- LUE      Patient Education and Home Exercises     Education provided:   -   - Progress towards goals      Written Home Exercises Provided: Patient instructed to cont prior HEP.  Exercises were reviewed and Mustapha was able to demonstrate them prior to the end of the session.  Mustapha demonstrated good  understanding of the home exercise program provided. See electronic medical record under Patient Instructions for exercises provided during therapy sessions.       Assessment          Mustapha is progressing well towards his goals and there are no updates to goals at this time. Pt prognosis is Good.     Patient will continue to benefit from skilled outpatient occupational therapy to address the deficits listed in the problem list on initial evaluation provide patient/family education and to maximize patient's level of independence in the home and community environment.     Patient's spiritual, cultural and educational needs considered and patient agreeable to plan of care and goals.    Anticipated barriers to occupational therapy:     Goals:  Pt. Will increase AROM of LUE as measured by goniometric measurements.  Pt. Will increase Left /pinch strength as measured by dynamometer/pinch gauge.  Pt. Will demonstrate abiltiy to utilize LUE to perform functional tasks (I).  Pt. Will be (I) with HEP.    Plan     Continue OT POC.    MINE GRIFFITH, MILAD   7/8/2024

## 2024-07-08 NOTE — PROGRESS NOTES
Nawaf Rutledge MD    [address], [place] [address]  Phone: [phone]     Subjective     Name: Mustapha Christina   YOB: 1960 (63 y.o.)  MRN: [id number]  Visit Date: 7/8/2024   Chief Complaint: Follow-up (Room 5 2 week f/u )        HISTORY OF PRESENT ILLNESS:  Patient is a 63-year-old male with a past medical history of Ulnar neuropathy, HLD, Syncope and Lower back pain who presents to the [facility] for a post fall follow-up that occurred roughly a month ago while at work. States that his symptoms are improving since last visit with Dr. Wells. Reports that he is attending physical therapy and have more sessions. States that the Naproxen is managing the discomfort. Patient did visit the Orthopedic in late June for the left side Ulnar discomfort that is now improving. Patient states he ready to return back to work. Encouraged  patient to visit the Spine Specialist for lower back discomfort; referral placed. Patient can return back to work on 07/24/2024 without any restrictions.       PAST MEDICAL HISTORY:  Significant Diagnoses - Patient  has no past medical history on file.  Medications - Patient has a current medication list which includes the following long-term medication(s): rosuvastatin.   Allergies - Patient has No Known Allergies.  Surgeries - Patient  has no past surgical history on file.  Family History - Patient family history is not on file.      SOCIAL HISTORY:  Tobacco - Patient  reports that he has never smoked. He has never used smokeless tobacco.   Alcohol - Patient  reports that he does not currently use alcohol.   Recreational Drugs - Patient  reports no history of drug use.       Review of Systems   Constitutional: Negative.    Respiratory: Negative.     Cardiovascular: Negative.    Musculoskeletal:  Positive for back pain.   Neurological:  Positive for numbness. Negative for dizziness and weakness.        History reviewed. No pertinent past medical  "history.     Review of patient's allergies indicates:  No Known Allergies     History reviewed. No pertinent surgical history.     History reviewed. No pertinent family history.    Current Outpatient Medications   Medication Instructions    celecoxib (CELEBREX) 200 mg, Oral, Daily    cephALEXin (KEFLEX) 750 mg, 2 times daily    cyclobenzaprine (FLEXERIL) 5 mg, Oral, Nightly    meloxicam (MOBIC) 7.5 mg, Oral, Daily    rosuvastatin (CRESTOR) 10 mg, Oral, Nightly        Objective     /77 (BP Location: Left arm, Patient Position: Sitting, BP Method: Medium (Automatic))   Pulse 74   Resp 18   Ht 5' 8" (1.727 m)   Wt 81.6 kg (180 lb)   SpO2 97%   BMI 27.37 kg/m²     Physical Exam  Vitals and nursing note reviewed.   Constitutional:       General: He is not in acute distress.     Appearance: Normal appearance. He is not ill-appearing.   HENT:      Head: Normocephalic and atraumatic.   Cardiovascular:      Rate and Rhythm: Normal rate.      Heart sounds: No murmur heard.     No friction rub. No gallop.   Pulmonary:      Effort: No respiratory distress.   Chest:      Chest wall: No tenderness.   Musculoskeletal:         General: Normal range of motion.      Cervical back: Normal range of motion.      Lumbar back: Tenderness present.   Neurological:      Mental Status: He is alert.   Psychiatric:         Mood and Affect: Mood normal.         Behavior: Behavior normal.         Thought Content: Thought content normal.        All recently obtained labs have been reviewed and discussed in detail with the patient.   Assessment     1. Acute low back pain without sciatica, unspecified back pain laterality    2. Injury resulting from fall from height         Plan     Problem List Items Addressed This Visit          Orthopedic    Acute low back pain without sciatica - Primary    Relevant Orders    X-Ray Sacrum And Coccyx    Injury resulting from fall from height     07/08  - Patient for a two-week follow up from last visit " regarding a fall/injury that was work related  - Reports improving and is ready to return back to work  -States seeing PT and Orthopedics for strength and left ulnar discomfort  -States Naproxen is working with the pain   - Referral place for lumbar spinal discomfort; awaiting appointment  - At this time, patient will be cleared back to work on 07/24/2024; without restrictions   - Xray of the sacrum pending radiology interpretation               All orders:  Orders Placed This Encounter    X-Ray Sacrum And Coccyx          Follow up in about 2 years (around 7/8/2026).    Instructed patient that if symptoms fail to improve or worsen patient should seek immediate medical attention or report to the nearest emergency department. Patient expressed verbal agreement and understanding to this plan of care.   Nawaf Rutledge MD  Family Medicine Residency PGY-2  [facility]

## 2024-07-08 NOTE — LETTER
July 8, 2024      [facility]  [address]  [place] [address]  Phone: [phone]  Fax: [phone]       Patient: Mustapha Christina   YOB: 1960  Date of Visit: 07/08/2024    To Whom It May Concern:    Kinza Christina  was at [facility] on 07/08/2024. The patient may return to work/school on 07/24/2024 with no restrictions. If you have any questions or concerns, or if I can be of further assistance, please do not hesitate to contact me.    Sincerely,    Nawaf Rutledge MD

## 2024-07-09 DIAGNOSIS — S32.110D CLOSED NONDISPLACED ZONE I FRACTURE OF SACRUM WITH ROUTINE HEALING, SUBSEQUENT ENCOUNTER: Primary | ICD-10-CM

## 2024-07-10 ENCOUNTER — OFFICE VISIT (OUTPATIENT)
Dept: FAMILY MEDICINE | Facility: CLINIC | Age: 64
End: 2024-07-10
Payer: COMMERCIAL

## 2024-07-10 VITALS
TEMPERATURE: 98 F | OXYGEN SATURATION: 96 % | BODY MASS INDEX: 27.37 KG/M2 | WEIGHT: 180.63 LBS | DIASTOLIC BLOOD PRESSURE: 80 MMHG | HEIGHT: 68 IN | SYSTOLIC BLOOD PRESSURE: 120 MMHG | HEART RATE: 72 BPM | RESPIRATION RATE: 18 BRPM

## 2024-07-10 DIAGNOSIS — Z91.89 AT RISK FOR IMPAIRED MOBILITY: ICD-10-CM

## 2024-07-10 DIAGNOSIS — S32.2XXD CLOSED FRACTURE OF SACRUM AND COCCYX WITH ROUTINE HEALING: Primary | ICD-10-CM

## 2024-07-10 DIAGNOSIS — S32.10XD CLOSED FRACTURE OF SACRUM AND COCCYX WITH ROUTINE HEALING, SUBSEQUENT ENCOUNTER: ICD-10-CM

## 2024-07-10 DIAGNOSIS — M54.50 ACUTE RIGHT-SIDED LOW BACK PAIN WITHOUT SCIATICA: ICD-10-CM

## 2024-07-10 DIAGNOSIS — S32.10XD CLOSED FRACTURE OF SACRUM AND COCCYX WITH ROUTINE HEALING: Primary | ICD-10-CM

## 2024-07-10 DIAGNOSIS — S32.2XXD CLOSED FRACTURE OF SACRUM AND COCCYX WITH ROUTINE HEALING, SUBSEQUENT ENCOUNTER: ICD-10-CM

## 2024-07-10 RX ORDER — CYCLOBENZAPRINE HCL 10 MG
10 TABLET ORAL NIGHTLY
Qty: 30 TABLET | Refills: 0 | Status: SHIPPED | OUTPATIENT
Start: 2024-07-10 | End: 2024-07-10 | Stop reason: ALTCHOICE

## 2024-07-10 RX ORDER — CYCLOBENZAPRINE HYDROCHLORIDE 7.5 MG/1
7.5 TABLET, FILM COATED ORAL NIGHTLY
Qty: 30 TABLET | Refills: 0 | Status: SHIPPED | OUTPATIENT
Start: 2024-07-10 | End: 2024-08-09

## 2024-07-10 NOTE — PROGRESS NOTES
Nawaf Rutledge MD    [address], [place] [address]  Phone: [phone]     Subjective     Name: Mustapha Christina   YOB: 1960 (63 y.o.)  MRN: [id number]  Visit Date: 7/10/2024   Chief Complaint: Gastroesophageal Reflux (Patient came in today for a 1 month follow up on GERD. Patient stated on yesterday he found out why he is in a lot of pain in his back. He have a fracture bone at the end of his spine.) and Medication Problem (Patient stated the current medicine he is on for muscle spasms is not working for him)        HISTORY OF PRESENT ILLNESS:  Patient is a 63-year-old male with a past medical history of Ulnar neuropathy, HLD, Syncope and Lower back pain who presents to the [facility] for a imaging discussion. Patient was last seen on 07/08/2024 for post-fall follow up. At that time, states that symptoms are improving but have a sacrum discomfort. Imaging of the Sacrum and Coccyx disclosed nondisplaced sacral. Referral sent to Orthopedic at [facility]. Encouraged patient to extend his return to work.               PAST MEDICAL HISTORY:  Significant Diagnoses - Patient  has no past medical history on file.  Medications - Patient has a current medication list which includes the following long-term medication(s): rosuvastatin.   Allergies - Patient has No Known Allergies.  Surgeries - Patient  has no past surgical history on file.  Family History - Patient family history is not on file.      SOCIAL HISTORY:  Tobacco - Patient  reports that he has never smoked. He has never used smokeless tobacco.   Alcohol - Patient  reports that he does not currently use alcohol.   Recreational Drugs - Patient  reports no history of drug use.       Review of Systems   Musculoskeletal:  Positive for back pain.          History reviewed. No pertinent past medical history.     Review of patient's allergies indicates:  No Known Allergies     History reviewed. No pertinent surgical history.  "    History reviewed. No pertinent family history.    Current Outpatient Medications   Medication Instructions    cephALEXin (KEFLEX) 750 mg, 2 times daily    cyclobenzaprine (FEXMID) 7.5 mg, Oral, Nightly    meloxicam (MOBIC) 7.5 mg, Oral, Daily    rosuvastatin (CRESTOR) 10 mg, Oral, Nightly        Objective     /80 (BP Location: Right arm, Patient Position: Sitting, BP Method: Large (Automatic))   Pulse 72   Temp 97.6 °F (36.4 °C)   Resp 18   Ht 5' 8" (1.727 m)   Wt 81.9 kg (180 lb 9.6 oz)   SpO2 96%   BMI 27.46 kg/m²     Physical Exam  Vitals and nursing note reviewed.   Constitutional:       General: He is not in acute distress.     Appearance: Normal appearance. He is not ill-appearing.   HENT:      Head: Normocephalic and atraumatic.   Cardiovascular:      Pulses: Normal pulses.      Heart sounds: Normal heart sounds.   Pulmonary:      Effort: Pulmonary effort is normal.      Breath sounds: Normal breath sounds.   Musculoskeletal:         General: Normal range of motion.          All recently obtained labs have been reviewed and discussed in detail with the patient.   Assessment     1. Closed fracture of sacrum and coccyx with routine healing    2. Closed fracture of sacrum and coccyx with routine healing, subsequent encounter    3. At risk for impaired mobility    4. Acute right-sided low back pain without sciatica         Plan     Problem List Items Addressed This Visit          Orthopedic    Acute low back pain without sciatica    Relevant Medications    cyclobenzaprine (FEXMID) 7.5 MG Tab    Closed fracture of sacrum and coccyx with routine healing - Primary     07/10  - Patient was seen in clinic on 07/08 for lower back discomfort; post fall/injury at work  -Patient has worker's comp with [employer]  -Sacrum/Coccyx imaging disclosed nondisplaced fracture of the sacrum  - Referral placed for Orthopedic   -Per patient, requested crane  - Encouraged to continue PT  -Discontinued Celecoxib; " continue Meloxicam; educated patient that both NSAID's used together can cause renal and/or GI complication  -Increased Cyclobenzaprine 5 mg to 7.5 mg nightly; discontinued Cyclobenzaprine 5 mg           Other Visit Diagnoses       Closed fracture of sacrum and coccyx with routine healing, subsequent encounter        Relevant Orders    Ambulatory referral/consult to Orthopedics    At risk for impaired mobility        Relevant Orders    CANE FOR HOME USE              All orders:  Orders Placed This Encounter    CANE FOR HOME USE    Ambulatory referral/consult to Orthopedics    cyclobenzaprine (FEXMID) 7.5 MG Tab          Follow up in about 2 weeks (around 7/24/2024).    Instructed patient that if symptoms fail to improve or worsen patient should seek immediate medical attention or report to the nearest emergency department. Patient expressed verbal agreement and understanding to this plan of care.   Nawaf Rutledge MD  Family Medicine Residency PGY-2  [facility]

## 2024-07-10 NOTE — ASSESSMENT & PLAN NOTE
07/10  - Patient was seen in clinic on 07/08 for lower back discomfort; post fall/injury at work  -Patient has worker's comp with [employer]  -Sacrum/Coccyx imaging disclosed nondisplaced fracture of the sacrum  - Referral placed for Orthopedic   -Per patient, requested crane  - Encouraged to continue PT  -Discontinued Celecoxib; continue Meloxicam; educated patient that both NSAID's used together can cause renal and/or GI complication  -Increased Cyclobenzaprine 5 mg to 7.5 mg nightly; discontinued Cyclobenzaprine 5 mg

## 2024-07-10 NOTE — LETTER
July 10, 2024      [facility]  [address]  [place] [address]  Phone: [phone]  Fax: [phone]       Patient: Mustapha Christina   YOB: 1960  Date of Visit: 07/10/2024    To Whom It May Concern:    Kinza Christina  was at [facility] on 07/10/2024. The patient may return  work/school  to be determined with/without restrictions. If you have any questions or concerns, or if I can be of further assistance, please do not hesitate to contact me.    Sincerely,    Nawaf Rutledge MD

## 2024-07-12 ENCOUNTER — TELEPHONE (OUTPATIENT)
Dept: SPINE | Facility: CLINIC | Age: 64
End: 2024-07-12
Payer: COMMERCIAL

## 2024-07-15 ENCOUNTER — CLINICAL SUPPORT (OUTPATIENT)
Dept: REHABILITATION | Facility: HOSPITAL | Age: 64
End: 2024-07-15
Payer: OTHER MISCELLANEOUS

## 2024-07-15 DIAGNOSIS — R29.898 WEAKNESS OF BACK: Primary | ICD-10-CM

## 2024-07-15 DIAGNOSIS — M54.50 ACUTE BILATERAL LOW BACK PAIN WITHOUT SCIATICA: ICD-10-CM

## 2024-07-15 PROCEDURE — 97140 MANUAL THERAPY 1/> REGIONS: CPT | Mod: CQ

## 2024-07-15 PROCEDURE — 97110 THERAPEUTIC EXERCISES: CPT | Mod: CQ

## 2024-07-15 NOTE — PROGRESS NOTES
[facility]   Physical Therapy Treatment Note     Name: Mustapha Christina  Clinic Number: [id number]    Therapy Diagnosis:   Encounter Diagnoses   Name Primary?    Weakness of back Yes    Acute bilateral low back pain without sciatica        Physician: Sabra Keating DO    Visit Date: 7/15/2024    Physician Orders: PT Eval and Treat   Medical Diagnosis from Referral: Low Back Pain   Evaluation Date: 6/25/2024  Authorization Period Expiration: 6/14/2025  Plan of Care Expiration: 8/23/2024     Visit # / Visits authorized: 3 / 12   FOTO: 51/100  PTA Visit #: 1/5    Time In: 8:35 am   Time Out: 9:27 am  Total Billable Time: 46 billable minutes    Precautions: Standard  Functional Level at time of Evaluation: Back pain limits all mobility    Subjective     Pt reports: 8/10 pain in his low back/sacrum  He was compliant with home exercise program.    Pain: 8/10  Location: Low Back      Objective       Mustapha received therapeutic exercises to develop strength, endurance, ROM, flexibility, posture, and core stabilization for 38 minutes including:    Back Exercises     Bike/NuStep  NuStep 5 Min           Calf Stretch    Hamstring Stretch  4 x 15 sec hold    Piriformis Stretch   4 x 15 sec hold        Pelvic Tilts  x 10   Bridging  x 20 with green band     Hooklying Abduction  Green band, 20x    Bridging/Hooklying with Marching    Bridging/Hooklying with Knee Ext    Trunk Rotation Exercise  x 10 5 sec hold    Trunk Rotation Stretch With Swiss Ball, Flexion 10x10 sec hold   Ball - Trunk Rotation     Ball- Knee Extension     Planks    Quadruped               Mustapha received the following manual therapy techniques: Joint mobilizations, Manual traction, Soft tissue Mobilization, and Friction Massage were applied to the: Low Back for 8 minutes, including:  Long Axis traction (B)      Mustapha participated in neuromuscular re-education activities to improve: Balance, Coordination, Sense, Proprioception,  and Posture for 0 minutes. The following activities were included:      Mustapha participated in dynamic functional therapeutic activities to improve functional performance for 0  minutes, including:              Home Exercises Provided and Patient Education Provided     Education provided: Therapist initiated patient's Home Exercise Program today. Patient was given a written copy of the Home Exercise Program with pictures and written instructions for each exercise.  Instructed patient on proper form for all exercises and had patient return demonstration.       Written Home Exercises Provided: yes.  Exercises were reviewed and Mustapha was able to demonstrate them prior to the end of the session.  Mustapha demonstrated good  understanding of the education provided.     See EMR under Patient Instructions for exercises provided 7/2/2024.    Assessment     Pt presented to PT with elevated pain rating as noted above. Focused initially on mobility with tightness in bilateral hamstrings and QL. Progressed posterior chain strengthening with only complaints of muscular fatigue during and at end of session. Issued theraband today for pt to perform posterior chain exercises at home. Time billed reflects time spent one on one with pt.       PMH at time of Evaluation :   Mustapha is a 63 y.o. male referred to outpatient Physical Therapy with a medical diagnosis of Low Back Pain. 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 He does not know what happened to cause the fall. He landed on his back and Left Upper Extremity causing pain to both the back and the Left arm. He is having numbness and tingling in the Left hand. OT is addressing this. He is having low back pain that worsens with flexion. He denies radiculopathy at this time. MD ordered Outpatient Physical Therapy and patient is here today for the Evaluation.   Patient presents with decreased lumbar lordosis and a flattening of the lumbar spine most likely due  to degenerative changes in the lumbar spine. CT scan showed spinal canal and foraminal stenoses of L3-L4 through L5-S1 bilaterally. Unable to rule out disc pathology at this time, but he does not have any radicular symptoms and he was negative for SLR test and slump test which is good. He does have muscle spasms noted throughout the lumbar spine. Hip and Hamstring strength is slightly weaker than than other Lower Extremity musculature but overall good strength noted. His lumbosacral mobility is limited in all directions with forward flexion being the most painful.   Patient will benefit from skilled Physical Therapy intervention to address all deficits and help patient to return to their prior level of function with referral back to MD at completion of Therapy if pain and dysfunction persist.       Mustapha Is progressing well towards his goals.   Pt prognosis is Good.     Pt will continue to benefit from skilled outpatient physical therapy to address the deficits listed in the problem list box on initial evaluation, provide pt/family education and to maximize pt's level of independence in the home and community environment.     Pt's spiritual, cultural and educational needs considered and pt agreeable to plan of care and goals.     Anticipated Barriers for therapy: CT of the lumbar spine performed in the [facility] demonstrated mild to moderate foraminal stenoses of L3-L4 through L5-S1 bilaterally. Mild spinal canal stenosis also seen at these levels.     Goals:  Short Term Goals: 4 weeks   1. Patient will be Independent with Home Exercise Program   2. Patient will demonstrate with improved Posture and Body Mechanics  3. Patient will increase Lumbosacral Range of Motion by 25%   4. Patient will increase Lower Extremity and Core Strength to grossly 4+/5  5. Patient will decrease complaints of pain with activity to Less than or Equal to 5/10      Long Term Goals: 8 weeks   1. Patient will tolerate 30 minutes of activity  with complaints of Pain at Less Than or Equal to 4/10         Plan     Plan of care Certification: 6/25/2024 to 8/23/2024.     Outpatient Physical Therapy 2 times weekly for 8 weeks to include the following interventions: 72664 [therapeutic exercise], 53248 [neuromuscular re-education], 68770 [manual therapy], 68058 [therapeutic activities], 22652 [unattended electrical stimulation], and 36797 [mechanical traction]       Gonzalez Lara, PTA  7/15/2024

## 2024-07-17 ENCOUNTER — CLINICAL SUPPORT (OUTPATIENT)
Dept: REHABILITATION | Facility: HOSPITAL | Age: 64
End: 2024-07-17
Payer: OTHER MISCELLANEOUS

## 2024-07-17 DIAGNOSIS — R29.898 WEAKNESS OF BACK: Primary | ICD-10-CM

## 2024-07-17 PROCEDURE — 97530 THERAPEUTIC ACTIVITIES: CPT | Mod: CQ

## 2024-07-17 PROCEDURE — 97110 THERAPEUTIC EXERCISES: CPT | Mod: CQ

## 2024-07-17 NOTE — PROGRESS NOTES
[facility]   Physical Therapy Treatment Note     Name: Mustapha Christina  Clinic Number: [id number]    Therapy Diagnosis:   Encounter Diagnosis   Name Primary?    Weakness of back Yes       Physician: Sabra Keating DO    Visit Date: 7/17/2024    Physician Orders: PT Eval and Treat   Medical Diagnosis from Referral: Low Back Pain   Evaluation Date: 6/25/2024  Authorization Period Expiration: 6/14/2025  Plan of Care Expiration: 8/23/2024     Visit # / Visits authorized: 4 / 12   FOTO: 51/100  PTA Visit #: 2/5    Time In: 8:32 am   Time Out: 09:20 am  Total Billable Time: 48 billable minutes    Precautions: Standard  Functional Level at time of Evaluation: Back pain limits all mobility    Subjective     Pt reports: 8/10 pain in his low back/sacrum. He found out the Ortho Spine MD does not take Workers Comp cases and he is trying to find a doctor that will.   He was compliant with home exercise program.    Pain: 8/10  Location: Low Back      Objective       Mustapha received therapeutic exercises to develop strength, endurance, ROM, flexibility, posture, and core stabilization for 33 minutes including:    Back Exercises     Bike/NuStep  NuStep 5 Min           Calf Stretch 3 x 20 second hold    Hamstring Stretch  4 x 15 sec hold    Piriformis Stretch   4 x 15 sec hold       Bridging/Hooklying with Marching    Bridging/Hooklying with Knee Ext    Trunk Rotation Exercise  x 10 5 sec hold    Trunk Rotation Stretch With Swiss Ball, Flexion 10x10 sec hold   Ball - Trunk Rotation     Ball- Knee Extension     Planks    Quadruped     Cybex Rows - close   3 plates x 20   Cybex rows - wide  3 plates x 20        Mustapha received the following manual therapy techniques: Joint mobilizations, Manual traction, Soft tissue Mobilization, and Friction Massage were applied to the: Low Back for 0 minutes, including:  Long Axis traction (B)    Mustapha participated in neuromuscular re-education activities to  improve: Balance, Coordination, Sense, Proprioception, and Posture for 0 minutes. The following activities were included:    Mustapha participated in dynamic functional therapeutic activities to improve functional performance for 15 minutes, including:    Forward step ups x 15 (B) on 6inch step  Standing at rail:  Marching x 20 (B)  Hip abduction  x 20 each (B)    Home Exercises Provided and Patient Education Provided     Education provided: Therapist initiated patient's Home Exercise Program today. Patient was given a written copy of the Home Exercise Program with pictures and written instructions for each exercise.  Instructed patient on proper form for all exercises and had patient return demonstration.       Written Home Exercises Provided: yes.  Exercises were reviewed and Mustapha was able to demonstrate them prior to the end of the session.  Mustapha demonstrated good  understanding of the education provided.     See EMR under Patient Instructions for exercises provided 7/2/2024.    Assessment     Pt presented to PT today with continued reports of 8/10 pain in his low back/sacrum. He found out the Ortho Spine MD here does not take Workers Comp insurance and he is trying to find a doctor that will.  Focused initially on stretching and mobility today and followed this with posterior chain strengthening with only complaints of muscular fatigue during and at end of session. Added in the [employer] row machine today and standing functional activities at the rail to include marching and hip abduction. Patient fatigued with this.  Educated Patient to be diligent with home exercise program that has been established and we will progress as able when he returns 7/22.       PMH at time of Evaluation :   Mustapha is a 63 y.o. male referred to outpatient Physical Therapy with a medical diagnosis of Low Back Pain. 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 He does not know what happened to cause the fall. He  landed on his back and Left Upper Extremity causing pain to both the back and the Left arm. He is having numbness and tingling in the Left hand. OT is addressing this. He is having low back pain that worsens with flexion. He denies radiculopathy at this time. MD ordered Outpatient Physical Therapy and patient is here today for the Evaluation.   Patient presents with decreased lumbar lordosis and a flattening of the lumbar spine most likely due to degenerative changes in the lumbar spine. CT scan showed spinal canal and foraminal stenoses of L3-L4 through L5-S1 bilaterally. Unable to rule out disc pathology at this time, but he does not have any radicular symptoms and he was negative for SLR test and slump test which is good. He does have muscle spasms noted throughout the lumbar spine. Hip and Hamstring strength is slightly weaker than than other Lower Extremity musculature but overall good strength noted. His lumbosacral mobility is limited in all directions with forward flexion being the most painful.   Patient will benefit from skilled Physical Therapy intervention to address all deficits and help patient to return to their prior level of function with referral back to MD at completion of Therapy if pain and dysfunction persist.       Mustapha Is progressing well towards his goals.   Pt prognosis is Good.     Pt will continue to benefit from skilled outpatient physical therapy to address the deficits listed in the problem list box on initial evaluation, provide pt/family education and to maximize pt's level of independence in the home and community environment.     Pt's spiritual, cultural and educational needs considered and pt agreeable to plan of care and goals.     Anticipated Barriers for therapy: CT of the lumbar spine performed in the [facility] ER demonstrated mild to moderate foraminal stenoses of L3-L4 through L5-S1 bilaterally. Mild spinal canal stenosis also seen at these levels.     Goals:  Short Term Goals:  4 weeks   1. Patient will be Independent with Home Exercise Program   2. Patient will demonstrate with improved Posture and Body Mechanics  3. Patient will increase Lumbosacral Range of Motion by 25%   4. Patient will increase Lower Extremity and Core Strength to grossly 4+/5  5. Patient will decrease complaints of pain with activity to Less than or Equal to 5/10      Long Term Goals: 8 weeks   1. Patient will tolerate 30 minutes of activity with complaints of Pain at Less Than or Equal to 4/10      Plan     Plan of care Certification: 6/25/2024 to 8/23/2024.     Outpatient Physical Therapy 2 times weekly for 8 weeks to include the following interventions: 79561 [therapeutic exercise], 32146 [neuromuscular re-education], 72364 [manual therapy], 54337 [therapeutic activities], 65184 [unattended electrical stimulation], and 45386 [mechanical traction]       Arden Monge PTA  7/17/2024

## 2024-07-22 ENCOUNTER — CLINICAL SUPPORT (OUTPATIENT)
Dept: REHABILITATION | Facility: HOSPITAL | Age: 64
End: 2024-07-22
Payer: OTHER MISCELLANEOUS

## 2024-07-22 DIAGNOSIS — R29.898 WEAKNESS OF BACK: Primary | ICD-10-CM

## 2024-07-22 PROCEDURE — 97110 THERAPEUTIC EXERCISES: CPT | Mod: CQ

## 2024-07-22 PROCEDURE — 97530 THERAPEUTIC ACTIVITIES: CPT | Mod: CQ

## 2024-07-22 PROCEDURE — 97112 NEUROMUSCULAR REEDUCATION: CPT | Mod: CQ

## 2024-07-22 NOTE — PROGRESS NOTES
[facility]   Physical Therapy Treatment Note     Name: Mustapha Christina  Clinic Number: [id number]    Therapy Diagnosis:   Encounter Diagnosis   Name Primary?    Weakness of back Yes       Physician: Sabra Keating DO    Visit Date: 7/22/2024    Physician Orders: PT Eval and Treat   Medical Diagnosis from Referral: Low Back Pain   Evaluation Date: 6/25/2024  Authorization Period Expiration: 6/14/2025  Plan of Care Expiration: 8/23/2024     Visit # / Visits authorized: 5 / 12   FOTO: 51/100  PTA Visit #: 3/5    Time In: 8:19 am   Time Out: 9:16 am  Total Billable Time: 56 billable minutes    Precautions: Standard  Functional Level at time of Evaluation: Back pain limits all mobility    Subjective     Pt reports:feeling better  He was compliant with home exercise program.    Pain: 8/10  Location: Low Back      Objective       Mustapha received therapeutic exercises to develop strength, endurance, ROM, flexibility, posture, and core stabilization for 23 minutes including:    Back Exercises     Bike/NuStep  NuStep 5 Min           Calf Stretch  4 x 20 second hold    Hamstring Stretch  4 x 15 sec hold    Piriformis Stretch   4 x 15 sec hold       Bridging    Hooklying Abduction  blue, 30x    Bridging/Hooklying with Marching    Bridging/Hooklying with Knee Ext    Trunk Rotation Exercise  x 10 5 sec hold    Trunk Rotation Stretch With Swiss Ball, Flexion 10x10 sec hold   Ball - Trunk Rotation     Ball- Knee Extension     Planks    Quadruped                 Mustapha received the following manual therapy techniques: Joint mobilizations, Manual traction, Soft tissue Mobilization, and Friction Massage were applied to the: Low Back for 0 minutes, including:  Long Axis traction (B)    Mustapha participated in neuromuscular re-education activities to improve: Balance, Coordination, Sense, Proprioception, and Posture for 18 minutes. The following activities were included:  Hip abduction  x 20 each (B)  Hip  Ext x20 (B)  Bridges blue band 20x    Cybex Rows - close   3 plates x 20   Cybex rows - wide  3 plates x 20       Mustapha participated in dynamic functional therapeutic activities to improve functional performance for 8 minutes, including:    Forward step ups x 20 (B) on 6inch step  Squats 20x     Home Exercises Provided and Patient Education Provided     Education provided: Therapist initiated patient's Home Exercise Program today. Patient was given a written copy of the Home Exercise Program with pictures and written instructions for each exercise.  Instructed patient on proper form for all exercises and had patient return demonstration.       Written Home Exercises Provided: yes.  Exercises were reviewed and Mustapha was able to demonstrate them prior to the end of the session.  Mustapha demonstrated good  understanding of the education provided.     See EMR under Patient Instructions for exercises provided 7/2/2024.    Assessment     Pt doing well today with decreased pain upon arrival. Progressed mobility and posterior chain strengthening with no increased pain and only complaints of fatigue. Pt making good progress towards establishes goals. Will continue to progress as able.       PMH at time of Evaluation :   Mustapha is a 63 y.o. male referred to outpatient Physical Therapy with a medical diagnosis of Low Back Pain. 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 He does not know what happened to cause the fall. He landed on his back and Left Upper Extremity causing pain to both the back and the Left arm. He is having numbness and tingling in the Left hand. OT is addressing this. He is having low back pain that worsens with flexion. He denies radiculopathy at this time. MD ordered Outpatient Physical Therapy and patient is here today for the Evaluation.   Patient presents with decreased lumbar lordosis and a flattening of the lumbar spine most likely due to degenerative changes in the lumbar  spine. CT scan showed spinal canal and foraminal stenoses of L3-L4 through L5-S1 bilaterally. Unable to rule out disc pathology at this time, but he does not have any radicular symptoms and he was negative for SLR test and slump test which is good. He does have muscle spasms noted throughout the lumbar spine. Hip and Hamstring strength is slightly weaker than than other Lower Extremity musculature but overall good strength noted. His lumbosacral mobility is limited in all directions with forward flexion being the most painful.   Patient will benefit from skilled Physical Therapy intervention to address all deficits and help patient to return to their prior level of function with referral back to MD at completion of Therapy if pain and dysfunction persist.       Mustapha Is progressing well towards his goals.   Pt prognosis is Good.     Pt will continue to benefit from skilled outpatient physical therapy to address the deficits listed in the problem list box on initial evaluation, provide pt/family education and to maximize pt's level of independence in the home and community environment.     Pt's spiritual, cultural and educational needs considered and pt agreeable to plan of care and goals.     Anticipated Barriers for therapy: CT of the lumbar spine performed in the [facility] demonstrated mild to moderate foraminal stenoses of L3-L4 through L5-S1 bilaterally. Mild spinal canal stenosis also seen at these levels.     Goals:  Short Term Goals: 4 weeks   1. Patient will be Independent with Home Exercise Program   2. Patient will demonstrate with improved Posture and Body Mechanics  3. Patient will increase Lumbosacral Range of Motion by 25%   4. Patient will increase Lower Extremity and Core Strength to grossly 4+/5  5. Patient will decrease complaints of pain with activity to Less than or Equal to 5/10      Long Term Goals: 8 weeks   1. Patient will tolerate 30 minutes of activity with complaints of Pain at Less Than or  Equal to 4/10      Plan     Plan of care Certification: 6/25/2024 to 8/23/2024.     Outpatient Physical Therapy 2 times weekly for 8 weeks to include the following interventions: 95693 [therapeutic exercise], 37350 [neuromuscular re-education], 33162 [manual therapy], 64748 [therapeutic activities], 65825 [unattended electrical stimulation], and 61699 [mechanical traction]       Gonzalez Lara, PTA  7/22/2024

## 2024-07-24 ENCOUNTER — CLINICAL SUPPORT (OUTPATIENT)
Dept: REHABILITATION | Facility: HOSPITAL | Age: 64
End: 2024-07-24
Payer: OTHER MISCELLANEOUS

## 2024-07-24 ENCOUNTER — OFFICE VISIT (OUTPATIENT)
Dept: FAMILY MEDICINE | Facility: CLINIC | Age: 64
End: 2024-07-24
Payer: COMMERCIAL

## 2024-07-24 VITALS
RESPIRATION RATE: 18 BRPM | BODY MASS INDEX: 27.49 KG/M2 | HEART RATE: 89 BPM | OXYGEN SATURATION: 98 % | TEMPERATURE: 98 F | DIASTOLIC BLOOD PRESSURE: 84 MMHG | WEIGHT: 181.38 LBS | HEIGHT: 68 IN | SYSTOLIC BLOOD PRESSURE: 139 MMHG

## 2024-07-24 DIAGNOSIS — M54.50 ACUTE BILATERAL LOW BACK PAIN WITHOUT SCIATICA: ICD-10-CM

## 2024-07-24 DIAGNOSIS — S32.10XD CLOSED FRACTURE OF SACRUM AND COCCYX WITH ROUTINE HEALING, SUBSEQUENT ENCOUNTER: Primary | ICD-10-CM

## 2024-07-24 DIAGNOSIS — S32.10XD CLOSED FRACTURE OF SACRUM AND COCCYX WITH ROUTINE HEALING: ICD-10-CM

## 2024-07-24 DIAGNOSIS — R29.898 WEAKNESS OF BACK: Primary | ICD-10-CM

## 2024-07-24 DIAGNOSIS — S32.2XXD CLOSED FRACTURE OF SACRUM AND COCCYX WITH ROUTINE HEALING: ICD-10-CM

## 2024-07-24 DIAGNOSIS — S32.2XXD CLOSED FRACTURE OF SACRUM AND COCCYX WITH ROUTINE HEALING, SUBSEQUENT ENCOUNTER: Primary | ICD-10-CM

## 2024-07-24 PROCEDURE — 3079F DIAST BP 80-89 MM HG: CPT | Mod: CPTII,,, | Performed by: FAMILY MEDICINE

## 2024-07-24 PROCEDURE — 99213 OFFICE O/P EST LOW 20 MIN: CPT | Mod: GC,,, | Performed by: FAMILY MEDICINE

## 2024-07-24 PROCEDURE — 97112 NEUROMUSCULAR REEDUCATION: CPT | Mod: CQ

## 2024-07-24 PROCEDURE — 3075F SYST BP GE 130 - 139MM HG: CPT | Mod: CPTII,,, | Performed by: FAMILY MEDICINE

## 2024-07-24 PROCEDURE — 97530 THERAPEUTIC ACTIVITIES: CPT | Mod: CQ

## 2024-07-24 PROCEDURE — 1159F MED LIST DOCD IN RCRD: CPT | Mod: CPTII,,, | Performed by: FAMILY MEDICINE

## 2024-07-24 PROCEDURE — 97110 THERAPEUTIC EXERCISES: CPT | Mod: CQ

## 2024-07-24 PROCEDURE — 3008F BODY MASS INDEX DOCD: CPT | Mod: CPTII,,, | Performed by: FAMILY MEDICINE

## 2024-07-24 PROCEDURE — 1160F RVW MEDS BY RX/DR IN RCRD: CPT | Mod: CPTII,,, | Performed by: FAMILY MEDICINE

## 2024-07-24 PROCEDURE — 3044F HG A1C LEVEL LT 7.0%: CPT | Mod: CPTII,,, | Performed by: FAMILY MEDICINE

## 2024-07-24 RX ORDER — METHOCARBAMOL 500 MG/1
500 TABLET, FILM COATED ORAL 3 TIMES DAILY
Qty: 30 TABLET | Refills: 0 | Status: SHIPPED | OUTPATIENT
Start: 2024-07-24 | End: 2024-08-03

## 2024-07-24 NOTE — PROGRESS NOTES
[facility]   Physical Therapy Treatment Note     Name: Mustapha Christina  Clinic Number: [id number]    Therapy Diagnosis:   Encounter Diagnosis   Name Primary?    Weakness of back Yes     Physician: Sabra Keating DO    Visit Date: 7/24/2024    Physician Orders: PT Eval and Treat   Medical Diagnosis from Referral: Low Back Pain   Evaluation Date: 6/25/2024  Authorization Period Expiration: 6/14/2025  Plan of Care Expiration: 8/23/2024     Visit # / Visits authorized: 6 / 12   FOTO: 51/100  PTA Visit #: 4/5    Time In: 4:00 pm  Time Out: 4:45 pm  Total Billable Time: 45 billable minutes    Precautions: Standard  Functional Level at time of Evaluation: Back pain limits all mobility    Subjective     Pt reports: feeling pretty good, goes back to work on Monday. Saw MD this morning. Feeling pretty good, not using his cane anymore.   He was compliant with home exercise program.    Pain: 0/10  Location: Low Back      Objective       Mustapha received therapeutic exercises to develop strength, endurance, ROM, flexibility, posture, and core stabilization for 10 minutes including:    Back Exercises     Bike/NuStep  NuStep 5 Min           Calf Stretch  4 x 20 second hold    Hamstring Stretch  4 x 15 sec hold    Piriformis Stretch   4 x 15 sec hold       Bridging    Hooklying Abduction  blue, 30x    Ball - Trunk Rotation     Ball- Knee Extension     Planks    Quadruped               Mustapha received the following manual therapy techniques: Joint mobilizations, Manual traction, Soft tissue Mobilization, and Friction Massage were applied to the: Low Back for 0 minutes, including:  Long Axis traction (B)    Mustapha participated in neuromuscular re-education activities to improve: Balance, Coordination, Sense, Proprioception, and Posture for 23 minutes. The following activities were included:     Cybex Rows - close  4 plates x 20   Cybex rows - wide  4 plates x 20   Hip abduction  X 20 each (B) 1 plate    Hip extension  X 20 each (B) 1 plate   Bridges  X 20 blue band (not performed today)      Mustapha participated in dynamic functional therapeutic activities to improve functional performance for 8 minutes, including:    Forward step ups x 20 (B) on 6inch step  Squats 20x     Home Exercises Provided and Patient Education Provided     Education provided: Therapist initiated patient's Home Exercise Program today. Patient was given a written copy of the Home Exercise Program with pictures and written instructions for each exercise.  Instructed patient on proper form for all exercises and had patient return demonstration.       Written Home Exercises Provided: yes.  Exercises were reviewed and Mustapha was able to demonstrate them prior to the end of the session.  Mustapha demonstrated good  understanding of the education provided.     See EMR under Patient Instructions for exercises provided 7/2/2024.    Assessment     Pt reports he is doing well today with decreased pain upon arrival. Saw MD this morning, and is going back to work on Monday. He is not using his cane anymore with ambulation. Progressed mobility and posterior chain strengthening with no increased pain and only complaints of fatigue from addition of cybex multi hip machine. Pt making good progress towards establishes goals. Will continue to progress as able.       PMH at time of Evaluation :   Mustapha is a 63 y.o. male referred to outpatient Physical Therapy with a medical diagnosis of Low Back Pain. 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 He does not know what happened to cause the fall. He landed on his back and Left Upper Extremity causing pain to both the back and the Left arm. He is having numbness and tingling in the Left hand. OT is addressing this. He is having low back pain that worsens with flexion. He denies radiculopathy at this time. MD ordered Outpatient Physical Therapy and patient is here today for the Evaluation.    Patient presents with decreased lumbar lordosis and a flattening of the lumbar spine most likely due to degenerative changes in the lumbar spine. CT scan showed spinal canal and foraminal stenoses of L3-L4 through L5-S1 bilaterally. Unable to rule out disc pathology at this time, but he does not have any radicular symptoms and he was negative for SLR test and slump test which is good. He does have muscle spasms noted throughout the lumbar spine. Hip and Hamstring strength is slightly weaker than than other Lower Extremity musculature but overall good strength noted. His lumbosacral mobility is limited in all directions with forward flexion being the most painful.   Patient will benefit from skilled Physical Therapy intervention to address all deficits and help patient to return to their prior level of function with referral back to MD at completion of Therapy if pain and dysfunction persist.       Mustapha Is progressing well towards his goals.   Pt prognosis is Good.     Pt will continue to benefit from skilled outpatient physical therapy to address the deficits listed in the problem list box on initial evaluation, provide pt/family education and to maximize pt's level of independence in the home and community environment.     Pt's spiritual, cultural and educational needs considered and pt agreeable to plan of care and goals.     Anticipated Barriers for therapy: CT of the lumbar spine performed in the [facility] demonstrated mild to moderate foraminal stenoses of L3-L4 through L5-S1 bilaterally. Mild spinal canal stenosis also seen at these levels.     Goals:  Short Term Goals: 4 weeks   1. Patient will be Independent with Home Exercise Program   2. Patient will demonstrate with improved Posture and Body Mechanics  3. Patient will increase Lumbosacral Range of Motion by 25%   4. Patient will increase Lower Extremity and Core Strength to grossly 4+/5  5. Patient will decrease complaints of pain with activity to Less  than or Equal to 5/10      Long Term Goals: 8 weeks   1. Patient will tolerate 30 minutes of activity with complaints of Pain at Less Than or Equal to 4/10      Plan     Plan of care Certification: 6/25/2024 to 8/23/2024.     Outpatient Physical Therapy 2 times weekly for 8 weeks to include the following interventions: 28823 [therapeutic exercise], 16038 [neuromuscular re-education], 58001 [manual therapy], 99632 [therapeutic activities], 54155 [unattended electrical stimulation], and 03839 [mechanical traction]     Arden Monge, KIYA  7/24/2024

## 2024-07-24 NOTE — PROGRESS NOTES
aNwaf Rutledge MD    [address], [place] [address]  Phone: [phone]     Subjective     Name: Mustapha Christina   YOB: 1960 (63 y.o.)  MRN: [id number]  Visit Date: 7/24/2024   Chief Complaint: Back Pain (Patient came in today for his right sided low back pain without sciatica. ) and Health Maintenance (Went over care gaps with patient he declines at this time.)        HISTORY OF PRESENT ILLNESS:  Patient is a 63-year-old male with a past medical history of Ulnar neuropathy, HLD, Syncope and Lower back pain who presents to the [facility] for a Subacute Sacrum fracture follow-up. Patient was last seen on 07/10/2024 for post-fall follow up. At that time, states that symptoms are improving  and discussed that imaging for the sacrum fracture. Today, states that his Worker Comp cannot find a provider for sacrum. At this time, advised to continue PT and conservative management. Patient requested to return back to work. He will follow up with Orthopedic next week for left arm. States he has improved.           PAST MEDICAL HISTORY:  Significant Diagnoses - Patient  has no past medical history on file.  Medications - Patient has a current medication list which includes the following long-term medication(s): rosuvastatin.   Allergies - Patient has No Known Allergies.  Surgeries - Patient  has no past surgical history on file.  Family History - Patient family history is not on file.      SOCIAL HISTORY:  Tobacco - Patient  reports that he has never smoked. He has never used smokeless tobacco.   Alcohol - Patient  reports that he does not currently use alcohol.   Recreational Drugs - Patient  reports no history of drug use.       Review of Systems   All other systems reviewed and are negative.         History reviewed. No pertinent past medical history.     Review of patient's allergies indicates:  No Known Allergies     History reviewed. No pertinent surgical history.     History  "reviewed. No pertinent family history.    Current Outpatient Medications   Medication Instructions    cephALEXin (KEFLEX) 750 mg, 2 times daily    meloxicam (MOBIC) 7.5 mg, Oral, Daily    methocarbamoL (ROBAXIN) 500 mg, Oral, 3 times daily    rosuvastatin (CRESTOR) 10 mg, Oral, Nightly        Objective     /84 (BP Location: Right arm, Patient Position: Sitting, BP Method: Large (Automatic))   Pulse 89   Temp 98 °F (36.7 °C) (Oral)   Resp 18   Ht 5' 8" (1.727 m)   Wt 82.3 kg (181 lb 6.4 oz)   SpO2 98%   BMI 27.58 kg/m²     Physical Exam  Vitals and nursing note reviewed.   Constitutional:       General: He is not in acute distress.     Appearance: Normal appearance. He is not ill-appearing.   Cardiovascular:      Rate and Rhythm: Normal rate.      Heart sounds: No murmur heard.     No friction rub. No gallop.   Pulmonary:      Effort: Pulmonary effort is normal.      Breath sounds: Normal breath sounds.   Musculoskeletal:         General: Normal range of motion.   Neurological:      Mental Status: He is alert.          All recently obtained labs have been reviewed and discussed in detail with the patient.   Assessment     1. Closed fracture of sacrum and coccyx with routine healing, subsequent encounter    2. Acute bilateral low back pain without sciatica    3. Closed fracture of sacrum and coccyx with routine healing         Plan     Problem List Items Addressed This Visit          Orthopedic    Acute low back pain without sciatica    Relevant Medications    methocarbamoL (ROBAXIN) 500 MG Tab    Closed fracture of sacrum and coccyx with routine healing     07/24  - States that symptoms has improved over the course of PT  -States that Cyclobenzaprine 7.5 mg nightly continue to cause drowsy  -Discontinued Cyclobenzaprine 7.5 mg; started on Methocarbamol 500 mg three times/day   - At this time, can return to work with no restriction; placed ordered for cushion to aid comfort with sitting   -Advised to " continue PT  -Follow-up with Orthopedics for Ulnar nerve  -States that workers comp cannot find a provider for Sacrum fracture; Cushion placed for medical store    - RTC in 2-weeks           Other Visit Diagnoses       Closed fracture of sacrum and coccyx with routine healing, subsequent encounter    -  Primary    Relevant Orders    WHEELCHAIR GEL CUSHION FOR HOME USE              All orders:  Orders Placed This Encounter    WHEELCHAIR GEL CUSHION FOR HOME USE    methocarbamoL (ROBAXIN) 500 MG Tab          Follow up in about 12 days (around 8/5/2024).    Instructed patient that if symptoms fail to improve or worsen patient should seek immediate medical attention or report to the nearest emergency department. Patient expressed verbal agreement and understanding to this plan of care.   Nawaf Rutledge MD  Family Medicine Residency PGY-2  [facility]

## 2024-07-24 NOTE — PROGRESS NOTES
[facility]   Physical Therapy Treatment Note     Name: Mustapha Christina  Clinic Number: [id number]    Therapy Diagnosis:   Encounter Diagnosis   Name Primary?    Weakness of back Yes     Physician: Sabra Keating DO    Visit Date: 7/24/2024    Physician Orders: PT Eval and Treat   Medical Diagnosis from Referral: Low Back Pain   Evaluation Date: 6/25/2024  Authorization Period Expiration: 6/14/2025  Plan of Care Expiration: 8/23/2024     Visit # / Visits authorized: 6 / 12   FOTO: 51/100  PTA Visit #: 4/5    Time In: 4:00 pm  Time Out: *** pm  Total Billable Time: *** billable minutes    Precautions: Standard  Functional Level at time of Evaluation: Back pain limits all mobility    Subjective     Pt reports: feeling pretty good, goes back to work on Monday.   He was compliant with home exercise program.    Pain: 0/10  Location: Low Back      Objective       Mustapha received therapeutic exercises to develop strength, endurance, ROM, flexibility, posture, and core stabilization for 23 minutes including:    Back Exercises     Bike/NuStep  NuStep 5 Min           Calf Stretch  4 x 20 second hold    Hamstring Stretch  4 x 15 sec hold    Piriformis Stretch   4 x 15 sec hold       Bridging    Hooklying Abduction  blue, 30x    Bridging/Hooklying with Marching    Bridging/Hooklying with Knee Ext    Trunk Rotation Exercise  x 10 5 sec hold    Trunk Rotation Stretch With Swiss Ball, Flexion 10x10 sec hold   Ball - Trunk Rotation     Ball- Knee Extension     Planks    Quadruped               Mustapha received the following manual therapy techniques: Joint mobilizations, Manual traction, Soft tissue Mobilization, and Friction Massage were applied to the: Low Back for 0 minutes, including:  Long Axis traction (B)    Mustapha participated in neuromuscular re-education activities to improve: Balance, Coordination, Sense, Proprioception, and Posture for 18 minutes. The following activities were  included:     Cybex Rows - close   3 plates x 20   Cybex rows - wide  3 plates x 20   Hip abduction  X 20 each (B)   Hip extension  X 20 each (B)   Bridges  X 20 blue band      Mustapha participated in dynamic functional therapeutic activities to improve functional performance for 8 minutes, including:    Forward step ups x 20 (B) on 6inch step  Squats 20x     Home Exercises Provided and Patient Education Provided     Education provided: Therapist initiated patient's Home Exercise Program today. Patient was given a written copy of the Home Exercise Program with pictures and written instructions for each exercise.  Instructed patient on proper form for all exercises and had patient return demonstration.       Written Home Exercises Provided: yes.  Exercises were reviewed and Mustapha was able to demonstrate them prior to the end of the session.  Mustapha demonstrated good  understanding of the education provided.     See EMR under Patient Instructions for exercises provided 7/2/2024.    Assessment     Pt doing well today with decreased pain upon arrival. Progressed mobility and posterior chain strengthening with no increased pain and only complaints of fatigue. Pt making good progress towards establishes goals. Will continue to progress as able.       PMH at time of Evaluation :   Mustapha is a 63 y.o. male referred to outpatient Physical Therapy with a medical diagnosis of Low Back Pain. 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 He does not know what happened to cause the fall. He landed on his back and Left Upper Extremity causing pain to both the back and the Left arm. He is having numbness and tingling in the Left hand. OT is addressing this. He is having low back pain that worsens with flexion. He denies radiculopathy at this time. MD ordered Outpatient Physical Therapy and patient is here today for the Evaluation.   Patient presents with decreased lumbar lordosis and a flattening of the lumbar  spine most likely due to degenerative changes in the lumbar spine. CT scan showed spinal canal and foraminal stenoses of L3-L4 through L5-S1 bilaterally. Unable to rule out disc pathology at this time, but he does not have any radicular symptoms and he was negative for SLR test and slump test which is good. He does have muscle spasms noted throughout the lumbar spine. Hip and Hamstring strength is slightly weaker than than other Lower Extremity musculature but overall good strength noted. His lumbosacral mobility is limited in all directions with forward flexion being the most painful.   Patient will benefit from skilled Physical Therapy intervention to address all deficits and help patient to return to their prior level of function with referral back to MD at completion of Therapy if pain and dysfunction persist.       Mustapha Is progressing well towards his goals.   Pt prognosis is Good.     Pt will continue to benefit from skilled outpatient physical therapy to address the deficits listed in the problem list box on initial evaluation, provide pt/family education and to maximize pt's level of independence in the home and community environment.     Pt's spiritual, cultural and educational needs considered and pt agreeable to plan of care and goals.     Anticipated Barriers for therapy: CT of the lumbar spine performed in the [facility] demonstrated mild to moderate foraminal stenoses of L3-L4 through L5-S1 bilaterally. Mild spinal canal stenosis also seen at these levels.     Goals:  Short Term Goals: 4 weeks   1. Patient will be Independent with Home Exercise Program   2. Patient will demonstrate with improved Posture and Body Mechanics  3. Patient will increase Lumbosacral Range of Motion by 25%   4. Patient will increase Lower Extremity and Core Strength to grossly 4+/5  5. Patient will decrease complaints of pain with activity to Less than or Equal to 5/10      Long Term Goals: 8 weeks   1. Patient will tolerate 30  minutes of activity with complaints of Pain at Less Than or Equal to 4/10      Plan     Plan of care Certification: 6/25/2024 to 8/23/2024.     Outpatient Physical Therapy 2 times weekly for 8 weeks to include the following interventions: 67168 [therapeutic exercise], 03837 [neuromuscular re-education], 73650 [manual therapy], 38074 [therapeutic activities], 48526 [unattended electrical stimulation], and 09287 [mechanical traction]     Arden Monge, KIYA  7/24/2024

## 2024-07-24 NOTE — LETTER
July 24, 2024      [facility]  [address]  [place] [address]  Phone: [phone]  Fax: [phone]       Patient: Mustapha Christina   YOB: 1960  Date of Visit: 07/24/2024    To Whom It May Concern:    Kinza Christina  was at [facility] on 07/24/2024. The patient may return to work/school on 07/29/2024 with no restrictions. If you have any questions or concerns, or if I can be of further assistance, please do not hesitate to contact me.    Sincerely,    Nawaf Rutledge MD

## 2024-07-24 NOTE — ASSESSMENT & PLAN NOTE
07/24  - States that symptoms has improved over the course of PT  -States that Cyclobenzaprine 7.5 mg nightly continue to cause drowsy  -Discontinued Cyclobenzaprine 7.5 mg; started on Methocarbamol 500 mg three times/day   - At this time, can return to work with no restriction; placed ordered for cushion to aid comfort with sitting   -Advised to continue PT  -Follow-up with Orthopedics for Ulnar nerve  -States that workers comp cannot find a provider for Sacrum fracture; Cushion placed for medical store    - RTC in 2-weeks

## 2024-07-29 ENCOUNTER — OFFICE VISIT (OUTPATIENT)
Dept: ORTHOPEDICS | Facility: CLINIC | Age: 64
End: 2024-07-29
Payer: OTHER MISCELLANEOUS

## 2024-07-29 DIAGNOSIS — G56.22 ULNAR NEUROPATHY OF LEFT UPPER EXTREMITY: Primary | ICD-10-CM

## 2024-07-29 PROCEDURE — 99213 OFFICE O/P EST LOW 20 MIN: CPT | Mod: PBBFAC | Performed by: ORTHOPAEDIC SURGERY

## 2024-07-29 PROCEDURE — 99213 OFFICE O/P EST LOW 20 MIN: CPT | Mod: S$PBB,,, | Performed by: ORTHOPAEDIC SURGERY

## 2024-07-29 PROCEDURE — 99999 PR PBB SHADOW E&M-EST. PATIENT-LVL III: CPT | Mod: PBBFAC,,, | Performed by: ORTHOPAEDIC SURGERY

## 2024-07-29 NOTE — PROGRESS NOTES
Patient is here for follow-up of his left elbow ulnar neuropathy.  He is improving after the anti-inflammatories.  Sensation in his returned fully.  He is able to abduct and adduct his fingers.  In his sacrum he has occasional soreness.  He had the nondisplaced fractures.  I will check him back in 4 weeks he is back to regular duty at 4 weeks from now I will get x-rays of his pelvis he has she will be at [facility] at that time.  He is back to work without restrictions.

## 2024-07-31 ENCOUNTER — CLINICAL SUPPORT (OUTPATIENT)
Dept: REHABILITATION | Facility: HOSPITAL | Age: 64
End: 2024-07-31
Payer: OTHER MISCELLANEOUS

## 2024-07-31 DIAGNOSIS — R29.898 WEAKNESS OF BACK: Primary | ICD-10-CM

## 2024-07-31 DIAGNOSIS — M54.50 ACUTE MIDLINE LOW BACK PAIN WITHOUT SCIATICA: ICD-10-CM

## 2024-07-31 PROCEDURE — 97110 THERAPEUTIC EXERCISES: CPT

## 2024-07-31 PROCEDURE — 97112 NEUROMUSCULAR REEDUCATION: CPT

## 2024-07-31 NOTE — PROGRESS NOTES
[facility]   Physical Therapy Treatment Note     Name: Mustapha Christina  Clinic Number: [id number]    Therapy Diagnosis:   Encounter Diagnosis   Name Primary?    Weakness of back Yes     Physician: Sabra Keating DO    Visit Date: 7/31/2024    Physician Orders: PT Eval and Treat   Medical Diagnosis from Referral: Low Back Pain   Evaluation Date: 6/25/2024  Authorization Period Expiration: 6/14/2025  Plan of Care Expiration: 8/23/2024     Visit # / Visits authorized: 7/12   FOTO: 51/100  PTA Visit #: 0/5    Time In: 4:00 pm  Time Out: 4:45 pm  Total Billable Time: 45 billable minutes    Precautions: Standard  Functional Level at time of Evaluation: Back pain limits all mobility    Subjective     Pt reports: He is doing well. Patient reports he returned to work on 7/29/2024 with no significant difficulty.  He was compliant with home exercise program.    Pain: 0/10  Location: Low Back      Objective     Mustapha received therapeutic exercises to develop strength, endurance, ROM, flexibility, posture, and core stabilization for 24 minutes including:    Back Exercises     Bike/NuStep  NuStep: 5 minutes           Calf Stretch  2 minutes    Hamstring Stretch  3 x 20 second holds each (seated)   Piriformis Stretch   3 x 20 second holds each (seated)      Pelvic Tilts  x 10 reps   Hooklying Hip Abduction Blue theraband; x 20 reps                             Mustapha received the following manual therapy techniques: Joint mobilizations, Manual traction, Soft tissue Mobilization, and Friction Massage were applied to the: Low Back for 0 minutes, including:    Long Axis traction (B)    Mustapha participated in neuromuscular re-education activities to improve: Balance, Coordination, Sense, Proprioception, and Posture for 21 minutes. The following activities were included:    Cybex Rows - close  4 plates x 20 reps   Cybex rows - wide  4 plates x 20 reps   Hip abduction  X 20 each (B) 1 plate   Hip  extension  X 20 each (B) 1 plate   Bridges  X 20 blue band      Mustapha participated in dynamic functional therapeutic activities to improve functional performance for 0 minutes, including:    Forward step ups x 20 (B) on 6inch step  Squats 20x     Home Exercises Provided and Patient Education Provided     Education provided: Therapist initiated patient's Home Exercise Program today. Patient was given a written copy of the Home Exercise Program with pictures and written instructions for each exercise.  Instructed patient on proper form for all exercises and had patient return demonstration.     Written Home Exercises Provided: yes.  Exercises were reviewed and Mustapha was able to demonstrate them prior to the end of the session.  Mustapha demonstrated good  understanding of the education provided.     See EMR under Patient Instructions for exercises provided 7/2/2024.    Assessment     Patient reported he was doing well pre-treatment, even after being back at work x 3 days. Patient reports he has enjoyed the progression of strengthening exercises and is beginning to feel stronger through his core and lower extremities. Physical Therapist will continue to progress therapeutic exercise, neuromuscular re-education, therapeutic activities, and gait training as able with manual therapy and modalities utilized as needed.     PMH at time of Evaluation :   Mustapha is a 63 y.o. male referred to outpatient Physical Therapy with a medical diagnosis of Low Back Pain. 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 He does not know what happened to cause the fall. He landed on his back and Left Upper Extremity causing pain to both the back and the Left arm. He is having numbness and tingling in the Left hand. OT is addressing this. He is having low back pain that worsens with flexion. He denies radiculopathy at this time. MD ordered Outpatient Physical Therapy and patient is here today for the Evaluation.   Patient  presents with decreased lumbar lordosis and a flattening of the lumbar spine most likely due to degenerative changes in the lumbar spine. CT scan showed spinal canal and foraminal stenoses of L3-L4 through L5-S1 bilaterally. Unable to rule out disc pathology at this time, but he does not have any radicular symptoms and he was negative for SLR test and slump test which is good. He does have muscle spasms noted throughout the lumbar spine. Hip and Hamstring strength is slightly weaker than than other Lower Extremity musculature but overall good strength noted. His lumbosacral mobility is limited in all directions with forward flexion being the most painful.   Patient will benefit from skilled Physical Therapy intervention to address all deficits and help patient to return to their prior level of function with referral back to MD at completion of Therapy if pain and dysfunction persist.       Mustapha Is progressing well towards his goals.   Pt prognosis is Good.     Pt will continue to benefit from skilled outpatient physical therapy to address the deficits listed in the problem list box on initial evaluation, provide pt/family education and to maximize pt's level of independence in the home and community environment.     Pt's spiritual, cultural and educational needs considered and pt agreeable to plan of care and goals.     Anticipated Barriers for therapy: CT of the lumbar spine performed in the [facility] demonstrated mild to moderate foraminal stenoses of L3-L4 through L5-S1 bilaterally. Mild spinal canal stenosis also seen at these levels.     Goals:  Short Term Goals: 4 weeks   1. Patient will be Independent with Home Exercise Program   2. Patient will demonstrate with improved Posture and Body Mechanics  3. Patient will increase Lumbosacral Range of Motion by 25%   4. Patient will increase Lower Extremity and Core Strength to grossly 4+/5  5. Patient will decrease complaints of pain with activity to Less than or  Equal to 5/10      Long Term Goals: 8 weeks   1. Patient will tolerate 30 minutes of activity with complaints of Pain at Less Than or Equal to 4/10      Plan     Plan of care Certification: 6/25/2024 to 8/23/2024.     Outpatient Physical Therapy 2 times weekly for 8 weeks to include the following interventions: 63461 [therapeutic exercise], 60209 [neuromuscular re-education], 24094 [manual therapy], 30274 [therapeutic activities], 02245 [unattended electrical stimulation], and 45468 [mechanical traction]       ÓSCAR MARROQUIN, PT, DPT  7/31/2024

## 2024-08-05 ENCOUNTER — OFFICE VISIT (OUTPATIENT)
Dept: FAMILY MEDICINE | Facility: CLINIC | Age: 64
End: 2024-08-05
Payer: COMMERCIAL

## 2024-08-05 VITALS
HEART RATE: 85 BPM | RESPIRATION RATE: 18 BRPM | SYSTOLIC BLOOD PRESSURE: 132 MMHG | HEIGHT: 68 IN | WEIGHT: 182 LBS | BODY MASS INDEX: 27.58 KG/M2 | DIASTOLIC BLOOD PRESSURE: 69 MMHG | TEMPERATURE: 98 F | OXYGEN SATURATION: 95 %

## 2024-08-05 DIAGNOSIS — M54.50 ACUTE LOW BACK PAIN WITHOUT SCIATICA, UNSPECIFIED BACK PAIN LATERALITY: Primary | ICD-10-CM

## 2024-08-05 PROCEDURE — 1160F RVW MEDS BY RX/DR IN RCRD: CPT | Mod: CPTII,,, | Performed by: FAMILY MEDICINE

## 2024-08-05 PROCEDURE — 99213 OFFICE O/P EST LOW 20 MIN: CPT | Mod: ,,, | Performed by: FAMILY MEDICINE

## 2024-08-05 PROCEDURE — 1159F MED LIST DOCD IN RCRD: CPT | Mod: CPTII,,, | Performed by: FAMILY MEDICINE

## 2024-08-05 PROCEDURE — 3044F HG A1C LEVEL LT 7.0%: CPT | Mod: CPTII,,, | Performed by: FAMILY MEDICINE

## 2024-08-05 PROCEDURE — 3008F BODY MASS INDEX DOCD: CPT | Mod: CPTII,,, | Performed by: FAMILY MEDICINE

## 2024-08-05 PROCEDURE — 3078F DIAST BP <80 MM HG: CPT | Mod: CPTII,,, | Performed by: FAMILY MEDICINE

## 2024-08-05 PROCEDURE — 3075F SYST BP GE 130 - 139MM HG: CPT | Mod: CPTII,,, | Performed by: FAMILY MEDICINE

## 2024-08-05 RX ORDER — CYCLOBENZAPRINE HCL 5 MG
5 TABLET ORAL 3 TIMES DAILY PRN
Qty: 30 TABLET | Refills: 0 | Status: SHIPPED | OUTPATIENT
Start: 2024-08-05 | End: 2024-08-15

## 2024-08-05 RX ORDER — MELOXICAM 7.5 MG/1
7.5 TABLET ORAL DAILY
Qty: 90 TABLET | Refills: 0 | Status: SHIPPED | OUTPATIENT
Start: 2024-08-05 | End: 2024-11-03

## 2024-08-12 ENCOUNTER — TELEPHONE (OUTPATIENT)
Dept: REHABILITATION | Facility: HOSPITAL | Age: 64
End: 2024-08-12
Payer: COMMERCIAL

## 2024-08-12 NOTE — TELEPHONE ENCOUNTER
Spoke with patient about missed appts; work-comp released pt to return to work. Will get remaining visits removed. Educated pt to call if he has any questions or concerns.

## 2024-10-23 ENCOUNTER — PATIENT MESSAGE (OUTPATIENT)
Dept: RESEARCH | Facility: HOSPITAL | Age: 64
End: 2024-10-23

## 2025-01-13 ENCOUNTER — ANESTHESIA (OUTPATIENT)
Dept: GASTROENTEROLOGY | Facility: HOSPITAL | Age: 65
End: 2025-01-13
Payer: COMMERCIAL

## 2025-01-13 ENCOUNTER — ANESTHESIA EVENT (OUTPATIENT)
Dept: GASTROENTEROLOGY | Facility: HOSPITAL | Age: 65
End: 2025-01-13
Payer: COMMERCIAL

## 2025-01-13 ENCOUNTER — HOSPITAL ENCOUNTER (OUTPATIENT)
Dept: GASTROENTEROLOGY | Facility: HOSPITAL | Age: 65
Discharge: HOME | End: 2025-01-13
Admitting: INTERNAL MEDICINE
Payer: COMMERCIAL

## 2025-01-13 VITALS
RESPIRATION RATE: 12 BRPM | SYSTOLIC BLOOD PRESSURE: 124 MMHG | OXYGEN SATURATION: 99 % | BODY MASS INDEX: 29.19 KG/M2 | TEMPERATURE: 98 F | WEIGHT: 186 LBS | DIASTOLIC BLOOD PRESSURE: 71 MMHG | HEART RATE: 78 BPM | HEIGHT: 67 IN

## 2025-01-13 DIAGNOSIS — Z12.11 ENCOUNTER FOR SCREENING COLONOSCOPY: ICD-10-CM

## 2025-01-13 PROCEDURE — D9220A PRA ANESTHESIA: Mod: ,,,

## 2025-01-13 PROCEDURE — 63600175 PHARM REV CODE 636 W HCPCS

## 2025-01-13 PROCEDURE — 25000003 PHARM REV CODE 250

## 2025-01-13 PROCEDURE — 37000008 HC ANESTHESIA 1ST 15 MINUTES

## 2025-01-13 PROCEDURE — G0121 COLON CA SCRN NOT HI RSK IND: HCPCS | Performed by: INTERNAL MEDICINE

## 2025-01-13 PROCEDURE — G0121 COLON CA SCRN NOT HI RSK IND: HCPCS | Mod: ,,, | Performed by: INTERNAL MEDICINE

## 2025-01-13 PROCEDURE — 37000009 HC ANESTHESIA EA ADD 15 MINS

## 2025-01-13 RX ORDER — LIDOCAINE HYDROCHLORIDE 20 MG/ML
INJECTION, SOLUTION EPIDURAL; INFILTRATION; INTRACAUDAL; PERINEURAL
Status: DISCONTINUED | OUTPATIENT
Start: 2025-01-13 | End: 2025-01-13

## 2025-01-13 RX ORDER — PHENYLEPHRINE HYDROCHLORIDE 10 MG/ML
INJECTION INTRAVENOUS
Status: DISCONTINUED | OUTPATIENT
Start: 2025-01-13 | End: 2025-01-13

## 2025-01-13 RX ORDER — PROPOFOL 10 MG/ML
VIAL (ML) INTRAVENOUS
Status: DISCONTINUED | OUTPATIENT
Start: 2025-01-13 | End: 2025-01-13

## 2025-01-13 RX ORDER — EPHEDRINE SULFATE 50 MG/ML
INJECTION, SOLUTION INTRAVENOUS
Status: DISCONTINUED | OUTPATIENT
Start: 2025-01-13 | End: 2025-01-13

## 2025-01-13 RX ORDER — OMEPRAZOLE 20 MG/1
20 TABLET, DELAYED RELEASE ORAL DAILY
COMMUNITY

## 2025-01-13 RX ORDER — SODIUM CHLORIDE 0.9 % (FLUSH) 0.9 %
10 SYRINGE (ML) INJECTION
Status: DISCONTINUED | OUTPATIENT
Start: 2025-01-13 | End: 2025-01-14 | Stop reason: HOSPADM

## 2025-01-13 RX ADMIN — EPHEDRINE SULFATE 25 MG: 50 INJECTION INTRAVENOUS at 03:01

## 2025-01-13 RX ADMIN — PHENYLEPHRINE HYDROCHLORIDE 100 MCG: 10 INJECTION INTRAVENOUS at 03:01

## 2025-01-13 RX ADMIN — PROPOFOL 120 MG: 10 INJECTION, EMULSION INTRAVENOUS at 03:01

## 2025-01-13 RX ADMIN — LIDOCAINE HYDROCHLORIDE 80 MG: 20 INJECTION, SOLUTION INTRAVENOUS at 03:01

## 2025-01-13 RX ADMIN — PROPOFOL 40 MG: 10 INJECTION, EMULSION INTRAVENOUS at 03:01

## 2025-01-13 NOTE — H&P
Gastroenterology Pre-procedure H&P    History of Present Illness    Mustapha Christina is a 64 y.o. male that  has no past medical history on file.     Patient here for routine index screening     Patient denies wt loss, abdominal pain, diarrhea, melena/hematochezia, change in stool caliber, no anticoagulants, FMHx of GI related malignancies.      No past medical history on file.    No past surgical history on file.    No family history on file.    Social History     Socioeconomic History    Marital status:    Tobacco Use    Smoking status: Never    Smokeless tobacco: Never   Substance and Sexual Activity    Alcohol use: Not Currently    Drug use: Never    Sexual activity: Yes       Current Outpatient Medications   Medication Sig Dispense Refill    cephALEXin (KEFLEX) 750 MG capsule Take 750 mg by mouth 2 (two) times daily. (Patient not taking: Reported on 7/8/2024)      rosuvastatin (CRESTOR) 10 MG tablet Take 1 tablet (10 mg total) by mouth every evening. (Patient not taking: Reported on 7/10/2024) 90 tablet 1     No current facility-administered medications for this encounter.       Review of patient's allergies indicates:  No Known Allergies    Objective:  There were no vitals filed for this visit.     GEN: normal appearing, NAD, AAO x3  HENT: NCAT, anicteric, OP benign  CV: normal rate, regular rhythm  RESP: NABS, symmetric rise, unlabored  ABD: soft, ND, no guarding or TTP  SKIN: warm and dry  NEURO: grossly afocal    Assessment and Plan:    Proceed with:    Colonoscopy for screening for colon cancer    Luis F Velez MD  Gastroenterology

## 2025-01-13 NOTE — LETTER
January 13, 2025    Mustapha Christina  [address] [facility] [place] [address]             [facility]  Gastroenterology  [address] [place] [address] [place] [address]  Phone: [phone]  Fax: [phone]   January 13, 2025     Patient: Mustapha Christina   YOB: 1960   Date of Visit: 1/13/2025       To Whom it May Concern:    Mustapha Christina was seen in my clinic on 1/13/2025. He may return to work on 1/14/2025.    Please excuse him from any work missed.    If you have any questions or concerns, please don't hesitate to call.    Sincerely,         Luis F Velez MD

## 2025-01-13 NOTE — ANESTHESIA POSTPROCEDURE EVALUATION
Anesthesia Post Evaluation    Patient: Mustapha Christina    Procedure(s) Performed: colonoscopy    Final Anesthesia Type: MAC      Patient location during evaluation: GI PACU  Patient participation: Yes- Able to Participate  Level of consciousness: awake and alert  Post-procedure vital signs: reviewed and stable  Pain management: adequate  Airway patency: patent    PONV status at discharge: No PONV  Anesthetic complications: no      Cardiovascular status: blood pressure returned to baseline  Respiratory status: unassisted and spontaneous ventilation  Hydration status: euvolemic  Follow-up not needed.  Comments: Refer to nursing note for pain and martin score upon discharge from recovery              Vitals Value Taken Time   /71 01/13/25 1608   Temp 36.5 °C (97.7 °F) 01/13/25 1523   Pulse 72 01/13/25 1608   Resp 12 01/13/25 1608   SpO2 99 % 01/13/25 1608   Vitals shown include unfiled device data.      Event Time   Out of Recovery 16:24:55         Pain/Martin Score: Martin Score: 10 (1/13/2025  3:53 PM)

## 2025-01-13 NOTE — DISCHARGE INSTRUCTIONS
Procedure Date  1/13/25     Impression  Overall Impression:   Scattered diverticulosis of mild severity in the descending colon and sigmoid colon  The ileocecal valve, appendiceal orifice, cecum, ascending colon and transverse colon appeared normal allowing for prep.  (grade 1) hemorrhoids  Prep was inadequate for screening         Recommendation  Repeat colonoscopy in 1 year with clear liquids only the day prior to the procedure       Outcome of procedure: successful Colonoscopy  Disposition: patient to recovery following procedure; discharge to home when appropriate parameters met  Provisions for follow up: please call my office for any unexpected symptoms like chest or abdominal pain or bleeding following your procedure.  Final Diagnosis: CRC screening     THE NURSE WILL CALL YOU WITH YOUR BIOPSY RESULTS IN A FEW DAYS. IF YOU HAVE  [facility] YOUR RESULTS WILL APPEAR THERE AS WELL.NO DRIVING, OPERATING EQUIPMENT, OR SIGNING LEGAL DOCUMENTS FOR 24 HOURS.Please call the GI Lab if you have any nausea, vomiting, or abdominal pain.

## 2025-01-13 NOTE — TRANSFER OF CARE
"Anesthesia Transfer of Care Note    Patient: Mustapha Christina    Procedure(s) Performed: colonoscopy    Patient location: GI    Anesthesia Type: general and MAC    Transport from OR: Transported from OR on room air with adequate spontaneous ventilation    Post pain: adequate analgesia    Post assessment: no apparent anesthetic complications    Post vital signs: stable    Level of consciousness: awake, alert and oriented    Nausea/Vomiting: no nausea/vomiting    Complications: none    Transfer of care protocol was followedComments: Refer to nursing note for pain martin score upon discharge from recovery      Last vitals: Visit Vitals  BP (!) 102/56   Pulse 67   Temp 36.5 °C (97.7 °F)   Resp 13   Ht 5' 7" (1.702 m)   Wt 84.4 kg (186 lb)   SpO2 100%   BMI 29.13 kg/m²     "

## 2025-01-13 NOTE — ANESTHESIA PREPROCEDURE EVALUATION
01/13/2025  Mustapha Christina is a 64 y.o., male.      Pre-op Assessment    I have reviewed the Patient Summary Reports.     I have reviewed the Nursing Notes. I have reviewed the NPO Status.   I have reviewed the Medications.     Review of Systems  Anesthesia Hx:  No problems with previous Anesthesia                Social:  Non-Smoker, No Alcohol Use       Cardiovascular:                hyperlipidemia                               Neurological:    Neuromuscular Disease,                                       Physical Exam  General: Well nourished, Cooperative, Alert and Oriented    Airway:  Mallampati: II   Mouth Opening: Normal  TM Distance: Normal  Tongue: Normal  Neck ROM: Normal ROM    Dental:  Intact    Chest/Lungs:  Clear to auscultation, Normal Respiratory Rate    Heart:  Rate: Normal  Rhythm: Regular Rhythm  Sounds: Normal    Abdomen:  Normal, Soft, Nontender        Anesthesia Plan  Type of Anesthesia, risks & benefits discussed:    Anesthesia Type: Gen Natural Airway, MAC  Intra-op Monitoring Plan: Standard ASA Monitors  Post Op Pain Control Plan: multimodal analgesia and IV/PO Opioids PRN  Induction:  IV  Informed Consent: Informed consent signed with the Patient and all parties understand the risks and agree with anesthesia plan.  All questions answered.   ASA Score: 2  Day of Surgery Review of History & Physical: I have interviewed and examined the patient. I have reviewed the patient's H&P dated:     Ready For Surgery From Anesthesia Perspective.     .

## 2025-01-27 ENCOUNTER — LAB VISIT (OUTPATIENT)
Dept: PRIMARY CARE CLINIC | Facility: CLINIC | Age: 65
End: 2025-01-27

## 2025-01-27 DIAGNOSIS — Z02.83 ENCOUNTER FOR DRUG SCREENING: Primary | ICD-10-CM

## 2025-01-27 PROCEDURE — 99000 SPECIMEN HANDLING OFFICE-LAB: CPT | Mod: ,,, | Performed by: NURSE PRACTITIONER

## 2025-01-27 NOTE — PROGRESS NOTES
Patient ID: Mustapha Christina is a 64 y.o. male.    Chief Complaint: No chief complaint on file.    History of Present Illness              Physical Exam              Assessment & Plan               1. Encounter for drug screening        No follow-ups on file.    This note was generated with the assistance of ambient listening technology. Verbal consent was obtained by the patient and accompanying visitor(s) for the recording of patient appointment to facilitate this note. I attest to having reviewed and edited the generated note for accuracy, though some syntax or spelling errors may persist. Please contact the author of this note for any clarification.

## 2025-08-19 ENCOUNTER — OFFICE VISIT (OUTPATIENT)
Dept: PRIMARY CARE CLINIC | Facility: CLINIC | Age: 65
End: 2025-08-19
Payer: OTHER MISCELLANEOUS

## 2025-08-19 VITALS
DIASTOLIC BLOOD PRESSURE: 62 MMHG | BODY MASS INDEX: 29.19 KG/M2 | HEART RATE: 78 BPM | SYSTOLIC BLOOD PRESSURE: 161 MMHG | TEMPERATURE: 98 F | WEIGHT: 186 LBS | HEIGHT: 67 IN | RESPIRATION RATE: 20 BRPM | OXYGEN SATURATION: 98 %

## 2025-08-19 DIAGNOSIS — L50.9 URTICARIA: ICD-10-CM

## 2025-08-19 DIAGNOSIS — T78.40XA ALLERGIC REACTION, INITIAL ENCOUNTER: Primary | ICD-10-CM

## 2025-08-19 PROCEDURE — 99213 OFFICE O/P EST LOW 20 MIN: CPT | Mod: 25,,, | Performed by: NURSE PRACTITIONER

## 2025-08-19 PROCEDURE — 96372 THER/PROPH/DIAG INJ SC/IM: CPT | Mod: ,,, | Performed by: NURSE PRACTITIONER

## 2025-08-19 RX ORDER — PREDNISONE 20 MG/1
20 TABLET ORAL DAILY
Qty: 5 TABLET | Refills: 0 | Status: CANCELLED | OUTPATIENT
Start: 2025-08-19

## 2025-08-19 RX ORDER — METHYLPREDNISOLONE 4 MG/1
TABLET ORAL
Qty: 1 EACH | Refills: 0 | Status: SHIPPED | OUTPATIENT
Start: 2025-08-19

## 2025-08-19 RX ORDER — METHYLPREDNISOLONE ACETATE 80 MG/ML
80 INJECTION, SUSPENSION INTRA-ARTICULAR; INTRALESIONAL; INTRAMUSCULAR; SOFT TISSUE
Status: COMPLETED | OUTPATIENT
Start: 2025-08-19 | End: 2025-08-19

## 2025-08-19 RX ORDER — DEXAMETHASONE SODIUM PHOSPHATE 4 MG/ML
4 INJECTION, SOLUTION INTRA-ARTICULAR; INTRALESIONAL; INTRAMUSCULAR; INTRAVENOUS; SOFT TISSUE
Status: COMPLETED | OUTPATIENT
Start: 2025-08-19 | End: 2025-08-19

## 2025-08-19 RX ADMIN — DEXAMETHASONE SODIUM PHOSPHATE 4 MG: 4 INJECTION, SOLUTION INTRA-ARTICULAR; INTRALESIONAL; INTRAMUSCULAR; INTRAVENOUS; SOFT TISSUE at 03:08

## 2025-08-19 RX ADMIN — METHYLPREDNISOLONE ACETATE 400 MG: 80 INJECTION, SUSPENSION INTRA-ARTICULAR; INTRALESIONAL; INTRAMUSCULAR; SOFT TISSUE at 03:08

## 2025-08-22 ENCOUNTER — OFFICE VISIT (OUTPATIENT)
Dept: PRIMARY CARE CLINIC | Facility: CLINIC | Age: 65
End: 2025-08-22
Payer: OTHER MISCELLANEOUS

## 2025-08-22 VITALS
WEIGHT: 186 LBS | HEIGHT: 67 IN | BODY MASS INDEX: 29.19 KG/M2 | SYSTOLIC BLOOD PRESSURE: 151 MMHG | DIASTOLIC BLOOD PRESSURE: 64 MMHG | RESPIRATION RATE: 20 BRPM | TEMPERATURE: 98 F | HEART RATE: 74 BPM | OXYGEN SATURATION: 98 %

## 2025-08-22 DIAGNOSIS — L50.9 URTICARIA: Primary | ICD-10-CM

## 2025-08-22 DIAGNOSIS — T78.40XD ALLERGIC REACTION, SUBSEQUENT ENCOUNTER: ICD-10-CM

## 2025-08-22 PROCEDURE — 99213 OFFICE O/P EST LOW 20 MIN: CPT | Mod: ,,, | Performed by: NURSE PRACTITIONER

## 2025-08-22 RX ORDER — DIAPER,BRIEF,INFANT-TODD,DISP
EACH MISCELLANEOUS 2 TIMES DAILY
Qty: 28 G | Refills: 0 | Status: SHIPPED | OUTPATIENT
Start: 2025-08-22

## 2025-08-26 ENCOUNTER — OFFICE VISIT (OUTPATIENT)
Dept: PRIMARY CARE CLINIC | Facility: CLINIC | Age: 65
End: 2025-08-26
Payer: OTHER MISCELLANEOUS

## 2025-08-26 VITALS
DIASTOLIC BLOOD PRESSURE: 74 MMHG | WEIGHT: 186 LBS | BODY MASS INDEX: 29.19 KG/M2 | HEIGHT: 67 IN | OXYGEN SATURATION: 97 % | TEMPERATURE: 98 F | SYSTOLIC BLOOD PRESSURE: 143 MMHG | RESPIRATION RATE: 20 BRPM | HEART RATE: 72 BPM

## 2025-08-26 DIAGNOSIS — T78.40XD ALLERGIC REACTION, SUBSEQUENT ENCOUNTER: ICD-10-CM

## 2025-08-26 DIAGNOSIS — L50.9 URTICARIA: ICD-10-CM

## 2025-08-26 DIAGNOSIS — T30.4 CHEMICAL BURN: Primary | ICD-10-CM

## 2025-08-26 DIAGNOSIS — L25.9 CONTACT DERMATITIS, UNSPECIFIED CONTACT DERMATITIS TYPE, UNSPECIFIED TRIGGER: ICD-10-CM
